# Patient Record
Sex: FEMALE | Race: WHITE | HISPANIC OR LATINO | Employment: FULL TIME | URBAN - METROPOLITAN AREA
[De-identification: names, ages, dates, MRNs, and addresses within clinical notes are randomized per-mention and may not be internally consistent; named-entity substitution may affect disease eponyms.]

---

## 2024-09-09 NOTE — PROGRESS NOTES
"Chief Complaint   Patient presents with    Annual Exam       HPI:   24 y.o. . Presents for well woman exam. Contraception:  Natural family planning, taking PNV daily   Menses:   q month, lasts 4-5 days, changes super tampon q 2-3 hrs on heaviest days.   Pain:  Moderate, not too bad  Last pap: records not available, normal per patient  Complaints: Pt has no complaints today.    Past Medical History:   Diagnosis Date    Anxiety     Migraine 3-5 years ago    Ovarian cyst 1-2 years ago      Past Surgical History:   Procedure Laterality Date    WISDOM TOOTH EXTRACTION        Family History   Problem Relation Age of Onset    Coronary artery disease Father     Hypertension Father     Diabetes Brother     Breast cancer Neg Hx     Uterine cancer Neg Hx     Ovarian cancer Neg Hx     Colon cancer Neg Hx      Allergies as of 10/04/2024    (No Known Allergies)        PCP: does manage PMHx and preventative labs    /74   Pulse 80   Ht 162.6 cm (64\")   Wt 93.4 kg (206 lb)   LMP 2024 (Exact Date)   BMI 35.36 kg/m²     PHYSICAL EXAM: Chaperone present   General- NAD, alert and oriented, appropriate  Psych- Normal mood, good memory  Neck- No masses, no thyroid enlargement  Lymphatic- No palpable neck, axillary, or groin nodes  CV- Regular rhythm, no murmurs  Resp- CTA to bases, no wheezes  Abdomen- Soft, non distended, non tender, no masses  Breast left-  Bilaterally symmetrical, no masses, non tender, no nipple discharge  Breast right- Bilaterally symmetrical, no masses, non tender, no nipple discharge  External genitalia- Normal female, no lesions  Urethra/meatus- Normal, no masses, non tender, no prolapse  Bladder- Normal, no masses, non tender, no prolapse  Vagina- Normal, no atrophy, no lesions, no discharge, no prolapse  Cvx- Normal, no lesions, no discharge, No cervical motion tenderness  Uterus- Normal size, shape & consistency.  Non tender, mobile.  Adnexa- No mass, non " tender  Anus/Rectum/Perineum- Not performed  Ext- No edema, no cyanosis    Skin- No lesions, no rashes, no acanthosis nigricans       ASSESSMENT and PLAN:    Diagnoses and all orders for this visit:    1. Well woman exam (Primary)  -     IGP,rfx Aptima HPV All Pth      Preventative:   BREAST HEALTH- Monthly self breast exam importance and how to reviewed. MMG and/or MRI (prn) reviewed per society guidelines and her individual history. Screening Imaging: Not medically needed  CERVICAL CANCER Screening- Reviewed current ASCCP guidelines for screening w and wo cotest HPV, age specific.  Screen: Updated today  COLON CANCER Screening- Reviewed current medical society guidelines and options.  Screen:  Not medically needed  SEXUAL HEALTH: Declines STD screening,  Safe sex and condoms  BONE HEALTH- Reviewed current medical society guidelines and options for testing, calcium and vit D intake.  Weight bearing exercise.  DEXA: Not medically needed  VACCINATIONS Recommended: COVID and booster PRN, Flu annually  Importance discussed, risk being unvaccinated reviewed.  Questions answered  Genetic testing: Does not qualify.  Smoking status- NON SMOKER  Follow up PCP/Specialist PMHx and Labs  TRACK MENSES, RTO if <q21d, >7d long, heavy or painful.    PNV daily and healthy lifestyle if desires pregnancy. Fu with PCP to discuss safer alternative tx for insomnia during pregnancy    Follow Up:  Return in about 1 year (around 10/4/2025).        Kellen Blank, APRN  10/04/2024

## 2024-09-24 ENCOUNTER — HOSPITAL ENCOUNTER (EMERGENCY)
Facility: HOSPITAL | Age: 24
Discharge: HOME | End: 2024-09-24
Attending: EMERGENCY MEDICINE | Admitting: EMERGENCY MEDICINE
Payer: COMMERCIAL

## 2024-09-24 VITALS
HEIGHT: 64 IN | WEIGHT: 205 LBS | OXYGEN SATURATION: 94 % | RESPIRATION RATE: 18 BRPM | SYSTOLIC BLOOD PRESSURE: 114 MMHG | DIASTOLIC BLOOD PRESSURE: 76 MMHG | TEMPERATURE: 98.2 F | BODY MASS INDEX: 35 KG/M2 | HEART RATE: 68 BPM

## 2024-09-24 DIAGNOSIS — N39.0 ACUTE UTI: Primary | ICD-10-CM

## 2024-09-24 LAB
ALBUMIN SERPL-MCNC: 4.1 G/DL (ref 3.5–5.2)
ALBUMIN/GLOB SERPL: 1.2 G/DL
ALP SERPL-CCNC: 87 U/L (ref 39–117)
ALT SERPL W P-5'-P-CCNC: 14 U/L (ref 1–33)
ANION GAP SERPL CALCULATED.3IONS-SCNC: 9.7 MMOL/L (ref 5–15)
AST SERPL-CCNC: 13 U/L (ref 1–32)
BACTERIA UR QL AUTO: ABNORMAL /HPF
BASOPHILS # BLD AUTO: 0.02 10*3/MM3 (ref 0–0.2)
BASOPHILS NFR BLD AUTO: 0.4 % (ref 0–1.5)
BILIRUB SERPL-MCNC: 0.3 MG/DL (ref 0–1.2)
BILIRUB UR QL STRIP: NEGATIVE
BUN SERPL-MCNC: 12 MG/DL (ref 6–20)
BUN/CREAT SERPL: 19.7 (ref 7–25)
CALCIUM SPEC-SCNC: 9.8 MG/DL (ref 8.6–10.5)
CHLORIDE SERPL-SCNC: 104 MMOL/L (ref 98–107)
CLARITY UR: ABNORMAL
CO2 SERPL-SCNC: 25.3 MMOL/L (ref 22–29)
COD CRY URNS QL: ABNORMAL /HPF
COLOR UR: ABNORMAL
CREAT SERPL-MCNC: 0.61 MG/DL (ref 0.57–1)
DEPRECATED RDW RBC AUTO: 39.8 FL (ref 37–54)
EGFRCR SERPLBLD CKD-EPI 2021: 129 ML/MIN/1.73
EOSINOPHIL # BLD AUTO: 0.06 10*3/MM3 (ref 0–0.4)
EOSINOPHIL NFR BLD AUTO: 1.1 % (ref 0.3–6.2)
ERYTHROCYTE [DISTWIDTH] IN BLOOD BY AUTOMATED COUNT: 12.9 % (ref 12.3–15.4)
GLOBULIN UR ELPH-MCNC: 3.5 GM/DL
GLUCOSE SERPL-MCNC: 94 MG/DL (ref 65–99)
GLUCOSE UR STRIP-MCNC: NEGATIVE MG/DL
HCG INTACT+B SERPL-ACNC: <0.5 MIU/ML
HCT VFR BLD AUTO: 42.7 % (ref 34–46.6)
HETEROPH AB SER QL LA: NEGATIVE
HGB BLD-MCNC: 14 G/DL (ref 12–15.9)
HGB UR QL STRIP.AUTO: ABNORMAL
HOLD SPECIMEN: NORMAL
HOLD SPECIMEN: NORMAL
HYALINE CASTS UR QL AUTO: ABNORMAL /LPF
HYPOCHROMIA BLD QL: NORMAL
IMM GRANULOCYTES # BLD AUTO: 0.01 10*3/MM3 (ref 0–0.05)
IMM GRANULOCYTES NFR BLD AUTO: 0.2 % (ref 0–0.5)
KETONES UR QL STRIP: NEGATIVE
LEUKOCYTE ESTERASE UR QL STRIP.AUTO: ABNORMAL
LIPASE SERPL-CCNC: 21 U/L (ref 13–60)
LYMPHOCYTES # BLD AUTO: 1.78 10*3/MM3 (ref 0.7–3.1)
LYMPHOCYTES NFR BLD AUTO: 33.1 % (ref 19.6–45.3)
MCH RBC QN AUTO: 27.6 PG (ref 26.6–33)
MCHC RBC AUTO-ENTMCNC: 32.8 G/DL (ref 31.5–35.7)
MCV RBC AUTO: 84.1 FL (ref 79–97)
MONOCYTES # BLD AUTO: 0.47 10*3/MM3 (ref 0.1–0.9)
MONOCYTES NFR BLD AUTO: 8.7 % (ref 5–12)
MUCOUS THREADS URNS QL MICRO: ABNORMAL /HPF
NEUTROPHILS NFR BLD AUTO: 3.04 10*3/MM3 (ref 1.7–7)
NEUTROPHILS NFR BLD AUTO: 56.5 % (ref 42.7–76)
NITRITE UR QL STRIP: NEGATIVE
NRBC BLD AUTO-RTO: 0 /100 WBC (ref 0–0.2)
PH UR STRIP.AUTO: 6 [PH] (ref 5–8)
PLATELET # BLD AUTO: 266 10*3/MM3 (ref 140–450)
PMV BLD AUTO: 9.8 FL (ref 6–12)
POTASSIUM SERPL-SCNC: 4 MMOL/L (ref 3.5–5.2)
PROT SERPL-MCNC: 7.6 G/DL (ref 6–8.5)
PROT UR QL STRIP: ABNORMAL
RBC # BLD AUTO: 5.08 10*6/MM3 (ref 3.77–5.28)
RBC # UR STRIP: ABNORMAL /HPF
REF LAB TEST METHOD: ABNORMAL
SMALL PLATELETS BLD QL SMEAR: ADEQUATE
SODIUM SERPL-SCNC: 139 MMOL/L (ref 136–145)
SP GR UR STRIP: 1.03 (ref 1–1.03)
SQUAMOUS #/AREA URNS HPF: ABNORMAL /HPF
UROBILINOGEN UR QL STRIP: ABNORMAL
WBC # UR STRIP: ABNORMAL /HPF
WBC MORPH BLD: NORMAL
WBC NRBC COR # BLD AUTO: 5.38 10*3/MM3 (ref 3.4–10.8)
WHOLE BLOOD HOLD COAG: NORMAL
WHOLE BLOOD HOLD SPECIMEN: NORMAL

## 2024-09-24 PROCEDURE — 84702 CHORIONIC GONADOTROPIN TEST: CPT | Performed by: EMERGENCY MEDICINE

## 2024-09-24 PROCEDURE — 25810000003 SODIUM CHLORIDE 0.9 % SOLUTION: Performed by: EMERGENCY MEDICINE

## 2024-09-24 PROCEDURE — 86308 HETEROPHILE ANTIBODY SCREEN: CPT | Performed by: EMERGENCY MEDICINE

## 2024-09-24 PROCEDURE — 85007 BL SMEAR W/DIFF WBC COUNT: CPT | Performed by: EMERGENCY MEDICINE

## 2024-09-24 PROCEDURE — 80053 COMPREHEN METABOLIC PANEL: CPT | Performed by: EMERGENCY MEDICINE

## 2024-09-24 PROCEDURE — 25010000002 CEFTRIAXONE PER 250 MG: Performed by: EMERGENCY MEDICINE

## 2024-09-24 PROCEDURE — 25010000002 KETOROLAC TROMETHAMINE PER 15 MG: Performed by: EMERGENCY MEDICINE

## 2024-09-24 PROCEDURE — 96365 THER/PROPH/DIAG IV INF INIT: CPT

## 2024-09-24 PROCEDURE — 81001 URINALYSIS AUTO W/SCOPE: CPT | Performed by: EMERGENCY MEDICINE

## 2024-09-24 PROCEDURE — 99283 EMERGENCY DEPT VISIT LOW MDM: CPT

## 2024-09-24 PROCEDURE — 83690 ASSAY OF LIPASE: CPT | Performed by: EMERGENCY MEDICINE

## 2024-09-24 PROCEDURE — 85025 COMPLETE CBC W/AUTO DIFF WBC: CPT | Performed by: EMERGENCY MEDICINE

## 2024-09-24 PROCEDURE — 25010000002 ONDANSETRON PER 1 MG: Performed by: EMERGENCY MEDICINE

## 2024-09-24 PROCEDURE — 96375 TX/PRO/DX INJ NEW DRUG ADDON: CPT

## 2024-09-24 RX ORDER — ONDANSETRON 4 MG/1
4 TABLET, ORALLY DISINTEGRATING ORAL EVERY 8 HOURS PRN
COMMUNITY
Start: 2024-09-19

## 2024-09-24 RX ORDER — CEPHALEXIN 500 MG/1
500 CAPSULE ORAL 3 TIMES DAILY
Qty: 21 CAPSULE | Refills: 0 | Status: SHIPPED | OUTPATIENT
Start: 2024-09-24

## 2024-09-24 RX ORDER — ESCITALOPRAM OXALATE 10 MG/1
10 TABLET ORAL EVERY MORNING
COMMUNITY
Start: 2024-07-23

## 2024-09-24 RX ORDER — ONDANSETRON 2 MG/ML
4 INJECTION INTRAMUSCULAR; INTRAVENOUS ONCE
Status: COMPLETED | OUTPATIENT
Start: 2024-09-24 | End: 2024-09-24

## 2024-09-24 RX ORDER — QUETIAPINE FUMARATE 25 MG/1
25 TABLET, FILM COATED ORAL
COMMUNITY
Start: 2024-07-23

## 2024-09-24 RX ORDER — SODIUM CHLORIDE 0.9 % (FLUSH) 0.9 %
10 SYRINGE (ML) INJECTION AS NEEDED
Status: DISCONTINUED | OUTPATIENT
Start: 2024-09-24 | End: 2024-09-24 | Stop reason: HOSPADM

## 2024-09-24 RX ORDER — KETOROLAC TROMETHAMINE 30 MG/ML
30 INJECTION, SOLUTION INTRAMUSCULAR; INTRAVENOUS ONCE
Status: COMPLETED | OUTPATIENT
Start: 2024-09-24 | End: 2024-09-24

## 2024-09-24 RX ORDER — VALACYCLOVIR HYDROCHLORIDE 1 G/1
1000 TABLET, FILM COATED ORAL 3 TIMES DAILY
Qty: 30 TABLET | Refills: 0 | Status: SHIPPED | OUTPATIENT
Start: 2024-09-24

## 2024-09-24 RX ORDER — FAMOTIDINE 10 MG/ML
20 INJECTION, SOLUTION INTRAVENOUS ONCE
Status: COMPLETED | OUTPATIENT
Start: 2024-09-24 | End: 2024-09-24

## 2024-09-24 RX ADMIN — CEFTRIAXONE SODIUM 2000 MG: 2 INJECTION, POWDER, FOR SOLUTION INTRAMUSCULAR; INTRAVENOUS at 11:14

## 2024-09-24 RX ADMIN — ONDANSETRON 4 MG: 2 INJECTION INTRAMUSCULAR; INTRAVENOUS at 10:57

## 2024-09-24 RX ADMIN — KETOROLAC TROMETHAMINE 30 MG: 30 INJECTION, SOLUTION INTRAMUSCULAR; INTRAVENOUS at 10:58

## 2024-09-24 RX ADMIN — SODIUM CHLORIDE 1000 ML: 9 INJECTION, SOLUTION INTRAVENOUS at 10:55

## 2024-09-24 RX ADMIN — FAMOTIDINE 20 MG: 10 INJECTION INTRAVENOUS at 10:57

## 2024-09-24 NOTE — ED PROVIDER NOTES
Time: 10:26 AM EDT  Date of encounter:  9/24/2024  Independent Historian/Clinical History and Information was obtained by:   Patient    History is limited by: N/A    Chief Complaint: Nausea and sore throat      History of Present Illness:  Patient is a 24 y.o. year old female who presents to the emergency department for evaluation of nausea and sore throat accompanied by fever that started last week.  Patient also reports burning when she eats.  Patient has no cough or hemoptysis.  Patient denies dysuria and urinary frequency.  Patient does report some mild abdominal pain.  Patient has had no diarrhea.      Patient Care Team  Primary Care Provider: Marina Booth APRN    Past Medical History:     No Known Allergies  History reviewed. No pertinent past medical history.  History reviewed. No pertinent surgical history.  History reviewed. No pertinent family history.    Home Medications:  Prior to Admission medications    Medication Sig Start Date End Date Taking? Authorizing Provider   escitalopram (LEXAPRO) 10 MG tablet Take 1 tablet by mouth Every Morning. 7/23/24  Yes Hui Arshad MD   ondansetron ODT (ZOFRAN-ODT) 4 MG disintegrating tablet Place 1 tablet on the tongue Every 8 (Eight) Hours As Needed. 9/19/24  Yes Hui Arshad MD   QUEtiapine (SEROquel) 25 MG tablet Take 1 tablet by mouth every night at bedtime. 7/23/24  Yes Hui Arshad MD        Social History:   Social History     Tobacco Use    Smoking status: Never    Smokeless tobacco: Never   Vaping Use    Vaping status: Never Used   Substance Use Topics    Alcohol use: Not Currently         Review of Systems:  Review of Systems   Constitutional:  Positive for fever. Negative for chills.   HENT:  Negative for congestion, rhinorrhea and sore throat.    Eyes:  Negative for pain and visual disturbance.   Respiratory:  Negative for apnea, cough, chest tightness and shortness of breath.    Cardiovascular:  Negative for chest pain and  "palpitations.   Gastrointestinal:  Positive for nausea. Negative for abdominal pain, diarrhea and vomiting.   Genitourinary:  Negative for difficulty urinating and dysuria.   Musculoskeletal:  Negative for joint swelling and myalgias.   Skin:  Negative for color change.   Neurological:  Negative for seizures and headaches.   Psychiatric/Behavioral: Negative.     All other systems reviewed and are negative.       Physical Exam:  /76 (BP Location: Left arm, Patient Position: Lying)   Pulse 68   Temp 98.2 °F (36.8 °C) (Oral)   Resp 18   Ht 162.6 cm (64\")   Wt 93 kg (205 lb)   LMP 09/02/2024 (Approximate)   SpO2 94%   BMI 35.19 kg/m²     Physical Exam  Vitals and nursing note reviewed.   Constitutional:       General: She is not in acute distress.     Appearance: Normal appearance. She is not toxic-appearing.   HENT:      Head: Normocephalic and atraumatic.      Jaw: There is normal jaw occlusion.   Eyes:      General: Lids are normal.      Extraocular Movements: Extraocular movements intact.      Conjunctiva/sclera: Conjunctivae normal.      Pupils: Pupils are equal, round, and reactive to light.   Cardiovascular:      Rate and Rhythm: Normal rate and regular rhythm.      Pulses: Normal pulses.      Heart sounds: Normal heart sounds.   Pulmonary:      Effort: Pulmonary effort is normal. No respiratory distress.      Breath sounds: Normal breath sounds. No wheezing or rhonchi.   Abdominal:      General: Abdomen is flat.      Palpations: Abdomen is soft.      Tenderness: There is no abdominal tenderness. There is no guarding or rebound.   Musculoskeletal:         General: Normal range of motion.      Cervical back: Normal range of motion and neck supple.      Right lower leg: No edema.      Left lower leg: No edema.   Skin:     General: Skin is warm and dry.   Neurological:      Mental Status: She is alert and oriented to person, place, and time. Mental status is at baseline.   Psychiatric:         Mood and " Affect: Mood normal.                  Procedures:  Procedures      Medical Decision Making:      Comorbidities that affect care:    None    External Notes reviewed:    None      The following orders were placed and all results were independently analyzed by me:  Orders Placed This Encounter   Procedures    [place] Draw    Comprehensive Metabolic Panel    Lipase    Urinalysis With Microscopic If Indicated (No Culture) - Urine, Clean Catch    hCG, Quantitative, Pregnancy    CBC Auto Differential    Scan Slide    Mononucleosis Screen    Urinalysis, Microscopic Only - Urine, Clean Catch    NPO Diet NPO Type: Strict NPO    Undress & Gown    Insert Peripheral IV    CBC & Differential    Green Top (Gel)    Lavender Top    Gold Top - SST    Light Blue Top       Medications Given in the Emergency Department:  Medications   sodium chloride 0.9 % flush 10 mL (has no administration in time range)   sodium chloride 0.9 % bolus 1,000 mL (0 mL Intravenous Stopped 9/24/24 1125)   ketorolac (TORADOL) injection 30 mg (30 mg Intravenous Given 9/24/24 1058)   ondansetron (ZOFRAN) injection 4 mg (4 mg Intravenous Given 9/24/24 1057)   famotidine (PEPCID) injection 20 mg (20 mg Intravenous Given 9/24/24 1057)   cefTRIAXone (ROCEPHIN) 2,000 mg in sodium chloride 0.9 % 100 mL IVPB-VTB (0 mg Intravenous Stopped 9/24/24 1144)        ED Course:         Labs:    Lab Results (last 24 hours)       Procedure Component Value Units Date/Time    CBC & Differential [[id number]] Collected: 09/24/24 1012    Specimen: Blood from Arm, Right Updated: 09/24/24 1048    Narrative:      The following orders were created for panel order CBC & Differential.  Procedure                               Abnormality         Status                     ---------                               -----------         ------                     CBC Auto Differential[[id number]]        Normal              Final result               Scan Slide[[id number]]                                        Final result                 Please view results for these tests on the individual orders.    Comprehensive Metabolic Panel [[id number]] Collected: 09/24/24 1012    Specimen: Blood from Arm, Right Updated: 09/24/24 1045     Glucose 94 mg/dL      BUN 12 mg/dL      Creatinine 0.61 mg/dL      Sodium 139 mmol/L      Potassium 4.0 mmol/L      Chloride 104 mmol/L      CO2 25.3 mmol/L      Calcium 9.8 mg/dL      Total Protein 7.6 g/dL      Albumin 4.1 g/dL      ALT (SGPT) 14 U/L      AST (SGOT) 13 U/L      Alkaline Phosphatase 87 U/L      Total Bilirubin 0.3 mg/dL      Globulin 3.5 gm/dL      A/G Ratio 1.2 g/dL      BUN/Creatinine Ratio 19.7     Anion Gap 9.7 mmol/L      eGFR 129.0 mL/min/1.73     Narrative:      GFR Normal >60  Chronic Kidney Disease <60  Kidney Failure <15      Lipase [[id number]]  (Normal) Collected: 09/24/24 1012    Specimen: Blood from Arm, Right Updated: 09/24/24 1045     Lipase 21 U/L     hCG, Quantitative, Pregnancy [[id number]] Collected: 09/24/24 1012    Specimen: Blood from Arm, Right Updated: 09/24/24 1041     HCG Quantitative <0.50 mIU/mL     Narrative:      HCG Ranges by Gestational Age    Females - non-pregnant premenopausal   </= 1mIU/mL HCG  Females - postmenopausal               </= 7mIU/mL HCG    3 Weeks       5.4   -      72 mIU/mL  4 Weeks      10.2   -     708 mIU/mL  5 Weeks       217   -   8,245 mIU/mL  6 Weeks       152   -  32,177 mIU/mL  7 Weeks     4,059   - 153,767 mIU/mL  8 Weeks    31,366   - 149,094 mIU/mL  9 Weeks    59,109   - 135,901 mIU/mL  10 Weeks   44,186   - 170,409 mIU/mL  12 Weeks   27,107   - 201,615 mIU/mL  14 Weeks   24,302   -  93,646 mIU/mL  15 Weeks   12,540   -  69,747 mIU/mL  16 Weeks    8,904   -  55,332 mIU/mL  17 Weeks    8,240   -  51,793 mIU/mL  18 Weeks    9,649   -  55,271 mIU/mL      CBC Auto Differential [[id number]]  (Normal) Collected: 09/24/24 1012    Specimen: Blood from Arm, Right Updated: 09/24/24 1048     WBC 5.38 10*3/mm3      RBC  5.08 10*6/mm3      Hemoglobin 14.0 g/dL      Hematocrit 42.7 %      MCV 84.1 fL      MCH 27.6 pg      MCHC 32.8 g/dL      RDW 12.9 %      RDW-SD 39.8 fl      MPV 9.8 fL      Platelets 266 10*3/mm3      Neutrophil % 56.5 %      Lymphocyte % 33.1 %      Monocyte % 8.7 %      Eosinophil % 1.1 %      Basophil % 0.4 %      Immature Grans % 0.2 %      Neutrophils, Absolute 3.04 10*3/mm3      Lymphocytes, Absolute 1.78 10*3/mm3      Monocytes, Absolute 0.47 10*3/mm3      Eosinophils, Absolute 0.06 10*3/mm3      Basophils, Absolute 0.02 10*3/mm3      Immature Grans, Absolute 0.01 10*3/mm3      nRBC 0.0 /100 WBC     Scan Slide [[id number]] Collected: 09/24/24 1012    Specimen: Blood from Arm, Right Updated: 09/24/24 1048     Hypochromia Slight/1+     WBC Morphology Normal     Platelet Estimate Adequate    Mononucleosis Screen [[id number]]  (Normal) Collected: 09/24/24 1012    Specimen: Blood from Arm, Right Updated: 09/24/24 1058     Monospot Negative    Urinalysis With Microscopic If Indicated (No Culture) - Urine, Clean Catch [[id number]]  (Abnormal) Collected: 09/24/24 1033    Specimen: Urine, Clean Catch Updated: 09/24/24 1049     Color, UA Dark Yellow     Appearance, UA Cloudy     pH, UA 6.0     Specific Gravity, UA 1.027     Glucose, UA Negative     Ketones, UA Negative     Bilirubin, UA Negative     Blood, UA Trace     Protein, UA Trace     Leuk Esterase, UA Small (1+)     Nitrite, UA Negative     Urobilinogen, UA 1.0 E.U./dL    Urinalysis, Microscopic Only - Urine, Clean Catch [[id number]]  (Abnormal) Collected: 09/24/24 1033    Specimen: Urine, Clean Catch Updated: 09/24/24 1101     RBC, UA 0-2 /HPF      WBC, UA 3-5 /HPF      Bacteria, UA 1+ /HPF      Squamous Epithelial Cells, UA 13-20 /HPF      Hyaline Casts, UA None Seen /LPF      Calcium Oxalate Crystals, UA Small/1+ /HPF      Mucus, UA Small/1+ /HPF      Methodology Manual Light Microscopy             Imaging:    No Radiology Exams Resulted Within Past 24  Hours      Differential Diagnosis and Discussion:    Fever: Based on the complaint of fever, differential diagnosis includes but is not limited to meningitis, pneumonia, pyelonephritis, acute uti,  systemic immune response syndrome, sepsis, viral syndrome, fungal infection, tick born illness and other bacterial infections.    All labs were reviewed and interpreted by me.    MDM     Based on the results of the patient's urinalysis and urinary complaints, signs, symptoms, and diagnostic testing is consistent with a urinary tract infection.  The patient´s CBC that was reviewed and interpreted by me shows no abnormalities of critical concern. Of note, there is no anemia requiring a blood transfusion and the platelet count is acceptable.  The patient´s CMP that was reviewed and interpretted by me shows no abnormalities of critical concern. Of note, the patient´s sodium and potassium are acceptable. The patient´s liver enzymes are unremarkable. The patient´s renal function (creatinine) is preserved. The patient has a normal anion gap.  Monospot is negative.  The patient had some lesions in her ear concerning for viral outbreak.  Patient is resting comfortably, is alert, and is in no distress. The repeat examination is unremarkable and benign. The patient has no signs of urosepsis. The patient was started on antibiotics in the emergency department and will be discharged with antibiotics as an outpatient. The patient was counseled to return to the ER for fever >100.5, intractable pain or vomiting, or any other concerns that the may have. The patient has expressed a clear and thorough understanding and agreed to follow up as instructed.                Patient Care Considerations:    I considered a soft tissue CT neck, however the patient has no signs of peritonsillar abscess.      Consultants/Shared Management Plan:    None    Social Determinants of Health:    Patient is independent, reliable, and has access to care.        Disposition and Care Coordination:    Discharged: I considered escalation of care by admitting this patient to the hospital, however patient has no fever that is uncontrolled and is able to tolerate p.o. intake.    I have explained the patient´s condition, diagnoses and treatment plan based on the information available to me at this time. I have answered questions and addressed any concerns. The patient has a good  understanding of the patient´s diagnosis, condition, and treatment plan as can be expected at this point. The vital signs have been stable. The patient´s condition is stable and appropriate for discharge from the emergency department.      The patient will pursue further outpatient evaluation with the primary care physician or other designated or consulting physician as outlined in the discharge instructions. They are agreeable to this plan of care and follow-up instructions have been explained in detail. The patient has received these instructions in written format and has expressed an understanding of the discharge instructions. The patient is aware that any significant change in condition or worsening of symptoms should prompt an immediate return to this or the closest emergency department or call to 911.  I have explained discharge medications and the need for follow up with the patient/caretakers. This was also printed in the discharge instructions. Patient was discharged with the following medications and follow up:      Medication List        New Prescriptions      cephalexin 500 MG capsule  Commonly known as: KEFLEX  Take 1 capsule by mouth 3 (Three) Times a Day.     valACYclovir 1000 MG tablet  Commonly known as: VALTREX  Take 1 tablet by mouth 3 (Three) Times a Day.               Where to Get Your Medications        These medications were sent to [facility]/pharmacy #[id number] - [place], [place] - [address] - [phone] [facility] [phone] FX  [address] [place] [address]       Hours: 24-hours Phone: [phone]   cephalexin 500 MG capsule  valACYclovir 1000 MG tablet      Marina Booth, APRN  [address] [address]  [phone]    In 2 days         Final diagnoses:   Acute UTI        ED Disposition       ED Disposition   Discharge    Condition   Stable    Comment   --               This medical record created using voice recognition software.             Charley Orlando MD  09/24/24 0163

## 2024-10-04 ENCOUNTER — OFFICE VISIT (OUTPATIENT)
Dept: OBSTETRICS AND GYNECOLOGY | Facility: CLINIC | Age: 24
End: 2024-10-04
Payer: COMMERCIAL

## 2024-10-04 VITALS
WEIGHT: 206 LBS | HEART RATE: 80 BPM | DIASTOLIC BLOOD PRESSURE: 74 MMHG | SYSTOLIC BLOOD PRESSURE: 113 MMHG | HEIGHT: 64 IN | BODY MASS INDEX: 35.17 KG/M2

## 2024-10-04 DIAGNOSIS — Z01.419 WELL WOMAN EXAM: Primary | ICD-10-CM

## 2024-10-04 PROCEDURE — G0123 SCREEN CERV/VAG THIN LAYER: HCPCS | Performed by: NURSE PRACTITIONER

## 2024-10-11 LAB
CONV .: NORMAL
CYTOLOGIST CVX/VAG CYTO: NORMAL
CYTOLOGY CVX/VAG DOC CYTO: NORMAL
CYTOLOGY CVX/VAG DOC THIN PREP: NORMAL
DX ICD CODE: NORMAL
Lab: NORMAL
Lab: NORMAL
OTHER STN SPEC: NORMAL
STAT OF ADQ CVX/VAG CYTO-IMP: NORMAL

## 2024-11-20 ENCOUNTER — INITIAL PRENATAL (OUTPATIENT)
Dept: OBSTETRICS AND GYNECOLOGY | Age: 24
End: 2024-11-20
Payer: COMMERCIAL

## 2024-11-20 ENCOUNTER — LAB (OUTPATIENT)
Facility: HOSPITAL | Age: 24
End: 2024-11-20
Payer: COMMERCIAL

## 2024-11-20 VITALS — BODY MASS INDEX: 36.56 KG/M2 | SYSTOLIC BLOOD PRESSURE: 126 MMHG | WEIGHT: 213 LBS | DIASTOLIC BLOOD PRESSURE: 82 MMHG

## 2024-11-20 DIAGNOSIS — O21.9 NAUSEA AND VOMITING IN PREGNANCY: ICD-10-CM

## 2024-11-20 DIAGNOSIS — Z3A.01 7 WEEKS GESTATION OF PREGNANCY: ICD-10-CM

## 2024-11-20 DIAGNOSIS — Z3A.01 LESS THAN 8 WEEKS GESTATION OF PREGNANCY: Primary | ICD-10-CM

## 2024-11-20 LAB
ABO GROUP BLD: NORMAL
BASOPHILS # BLD AUTO: 0.04 10*3/MM3 (ref 0–0.2)
BASOPHILS NFR BLD AUTO: 0.6 % (ref 0–1.5)
BLD GP AB SCN SERPL QL: NEGATIVE
DEPRECATED RDW RBC AUTO: 42.9 FL (ref 37–54)
EOSINOPHIL # BLD AUTO: 0.1 10*3/MM3 (ref 0–0.4)
EOSINOPHIL NFR BLD AUTO: 1.4 % (ref 0.3–6.2)
ERYTHROCYTE [DISTWIDTH] IN BLOOD BY AUTOMATED COUNT: 13.4 % (ref 12.3–15.4)
HBA1C MFR BLD: 5 % (ref 4.8–5.6)
HBV SURFACE AG SERPL QL IA: NORMAL
HCT VFR BLD AUTO: 39.7 % (ref 34–46.6)
HCV AB SER QL: NORMAL
HGB BLD-MCNC: 12.6 G/DL (ref 12–15.9)
HIV 1+2 AB+HIV1 P24 AG SERPL QL IA: NORMAL
IMM GRANULOCYTES # BLD AUTO: 0.03 10*3/MM3 (ref 0–0.05)
IMM GRANULOCYTES NFR BLD AUTO: 0.4 % (ref 0–0.5)
LYMPHOCYTES # BLD AUTO: 1.08 10*3/MM3 (ref 0.7–3.1)
LYMPHOCYTES NFR BLD AUTO: 15.3 % (ref 19.6–45.3)
MCH RBC QN AUTO: 28.1 PG (ref 26.6–33)
MCHC RBC AUTO-ENTMCNC: 31.7 G/DL (ref 31.5–35.7)
MCV RBC AUTO: 88.6 FL (ref 79–97)
MONOCYTES # BLD AUTO: 0.65 10*3/MM3 (ref 0.1–0.9)
MONOCYTES NFR BLD AUTO: 9.2 % (ref 5–12)
NEUTROPHILS NFR BLD AUTO: 5.17 10*3/MM3 (ref 1.7–7)
NEUTROPHILS NFR BLD AUTO: 73.1 % (ref 42.7–76)
NRBC BLD AUTO-RTO: 0 /100 WBC (ref 0–0.2)
PLATELET # BLD AUTO: 257 10*3/MM3 (ref 140–450)
PMV BLD AUTO: 11.6 FL (ref 6–12)
RBC # BLD AUTO: 4.48 10*6/MM3 (ref 3.77–5.28)
RH BLD: POSITIVE
RPR SER QL: NORMAL
WBC NRBC COR # BLD AUTO: 7.07 10*3/MM3 (ref 3.4–10.8)

## 2024-11-20 PROCEDURE — 80081 OBSTETRIC PANEL INC HIV TSTG: CPT | Performed by: STUDENT IN AN ORGANIZED HEALTH CARE EDUCATION/TRAINING PROGRAM

## 2024-11-20 PROCEDURE — 83036 HEMOGLOBIN GLYCOSYLATED A1C: CPT | Performed by: STUDENT IN AN ORGANIZED HEALTH CARE EDUCATION/TRAINING PROGRAM

## 2024-11-20 PROCEDURE — 86803 HEPATITIS C AB TEST: CPT | Performed by: STUDENT IN AN ORGANIZED HEALTH CARE EDUCATION/TRAINING PROGRAM

## 2024-11-20 PROCEDURE — 87591 N.GONORRHOEAE DNA AMP PROB: CPT | Performed by: STUDENT IN AN ORGANIZED HEALTH CARE EDUCATION/TRAINING PROGRAM

## 2024-11-20 PROCEDURE — 86787 VARICELLA-ZOSTER ANTIBODY: CPT | Performed by: STUDENT IN AN ORGANIZED HEALTH CARE EDUCATION/TRAINING PROGRAM

## 2024-11-20 PROCEDURE — 87086 URINE CULTURE/COLONY COUNT: CPT | Performed by: STUDENT IN AN ORGANIZED HEALTH CARE EDUCATION/TRAINING PROGRAM

## 2024-11-20 PROCEDURE — 36415 COLL VENOUS BLD VENIPUNCTURE: CPT | Performed by: STUDENT IN AN ORGANIZED HEALTH CARE EDUCATION/TRAINING PROGRAM

## 2024-11-20 PROCEDURE — 87491 CHLMYD TRACH DNA AMP PROBE: CPT | Performed by: STUDENT IN AN ORGANIZED HEALTH CARE EDUCATION/TRAINING PROGRAM

## 2024-11-20 RX ORDER — DIPHENHYDRAMINE HYDROCHLORIDE 25 MG/1
25 CAPSULE ORAL 3 TIMES DAILY
Qty: 90 TABLET | Refills: 3 | Status: SHIPPED | OUTPATIENT
Start: 2024-11-20

## 2024-11-20 NOTE — PROGRESS NOTES
[facility]   Obstetrics and Gynecology   New Obstetric Visit    2024    Patient: Rossy Silva          MR#:[id number]    History of Present Illness    Chief Complaint   Patient presents with    Initial Prenatal Visit     CC: new ob lmp 24.        24 y.o. female  at 7w4d presents for NOB visit.  She is doing well today.  Taking prenatal vitamins. She is having some nausea and appetite loss, but no vomiting. She also is having trouble sleeping as she was on Seroquel prior to pregnancy and has discontinued this.     Studies reviewed:  US Ob Transvaginal (2024 08:45)     ________________________________________  Patient Active Problem List   Diagnosis    7 weeks gestation of pregnancy     OB History          2    Para        Term                AB   1    Living             SAB   1    IAB        Ectopic        Molar        Multiple        Live Births                      Social History:  Denies h/o gonorrhea, chlamydia, herpes, syphilis, HIV  Denies family history of birth defects and genetic disorders    Past Medical History:   Diagnosis Date    Anxiety     Migraine 3-5 years ago    Ovarian cyst 1-2 years ago     Past Surgical History:   Procedure Laterality Date    WISDOM TOOTH EXTRACTION       Social History     Tobacco Use   Smoking Status Never   Smokeless Tobacco Never   Tobacco Comments    n/a     Family History   Problem Relation Age of Onset    Coronary artery disease Father     Hypertension Father     Diabetes Brother     Breast cancer Neg Hx     Uterine cancer Neg Hx     Ovarian cancer Neg Hx     Colon cancer Neg Hx      Prior to Admission medications    Medication Sig Start Date End Date Taking? Authorizing Provider   valACYclovir (VALTREX) 1000 MG tablet Take 1 tablet by mouth 3 (Three) Times a Day. 24   Charley Orlando MD   escitalopram (LEXAPRO) 10 MG tablet Take 1 tablet by mouth Every Morning. 24  ProviderHui MD  "  QUEtiapine (SEROquel) 25 MG tablet Take 1 tablet by mouth every night at bedtime. 24  Provider, MD Hui     ________________________________________    The following portions of the patient's history were reviewed and updated as appropriate: allergies, current medications, past family history, past medical history, past social history, past surgical history, and problem list.    Review of Systems   All other systems reviewed and are negative.           Objective     /82   Wt 96.6 kg (213 lb)   LMP 2024 (Exact Date)   BMI 36.56 kg/m²    BP Readings from Last 3 Encounters:   24 126/82   10/04/24 113/74   24 114/76      Wt Readings from Last 3 Encounters:   24 96.6 kg (213 lb)   10/04/24 93.4 kg (206 lb)   24 93 kg (205 lb)        BMI: Estimated body mass index is 36.56 kg/m² as calculated from the following:    Height as of 10/4/24: 162.6 cm (64\").    Weight as of this encounter: 96.6 kg (213 lb).    Physical Exam  Vitals and nursing note reviewed.   Constitutional:       Appearance: Normal appearance.   HENT:      Head: Normocephalic and atraumatic.   Pulmonary:      Effort: Pulmonary effort is normal.   Neurological:      Mental Status: She is alert and oriented to person, place, and time.   Psychiatric:         Mood and Affect: Mood normal.           Assessment:  24 y.o. female  at 7w4d presents for NOB visit.  Diagnoses and all orders for this visit:    1. Less than 8 weeks gestation of pregnancy (Primary)  -     OB Panel With HIV and RPR  -     Varicella Zoster Antibody, IgG  -     Hemoglobin A1c  -     Hepatitis C Antibody  -     Urine Culture - Urine, Urine, Clean Catch  -     Chlamydia trachomatis, Neisseria gonorrhoeae, PCR - Urine, Urine, Clean Catch    2. Nausea and vomiting in pregnancy  -     doxylamine (UNISOM) 25 MG tablet; Take 1 tablet by mouth At Night As Needed for Nausea.  Dispense: 60 tablet; Refill: 2  -     vitamin B-6 " (PYRIDOXINE) 25 MG tablet; Take 1 tablet by mouth 3 times a day.  Dispense: 90 tablet; Refill: 3    3. 7 weeks gestation of pregnancy  Overview:  -PNL: IOB labs collected 11/20/24, plan for GBS at 36wga  -Pap: completed 10/4/24 > normal   -Genetics: cfDNA/msAFP/carrier after 10 weeks, anatomy Us @20 weeks  -Imm: titers pending, tdap after 20 weeks  -Contraception: _  -Birthplan: _  -Care coordination: accepts blood transfusions, no latex allergy, call schedule reviewed                 Plan:  Return in about 4 weeks (around 12/18/2024).      Spuriya Sarmiento MD  11/20/2024 09:24 EST

## 2024-11-21 LAB
BACTERIA SPEC AEROBE CULT: NORMAL
C TRACH RRNA SPEC QL NAA+PROBE: NEGATIVE
N GONORRHOEA RRNA SPEC QL NAA+PROBE: NEGATIVE
RUBV IGG SERPL IA-ACNC: 1.39 INDEX
VZV IGG SER QL IA: REACTIVE

## 2024-12-18 ENCOUNTER — ROUTINE PRENATAL (OUTPATIENT)
Dept: OBSTETRICS AND GYNECOLOGY | Age: 24
End: 2024-12-18
Payer: COMMERCIAL

## 2024-12-18 ENCOUNTER — LAB (OUTPATIENT)
Facility: HOSPITAL | Age: 24
End: 2024-12-18
Payer: COMMERCIAL

## 2024-12-18 VITALS — DIASTOLIC BLOOD PRESSURE: 78 MMHG | WEIGHT: 209.8 LBS | BODY MASS INDEX: 36.01 KG/M2 | SYSTOLIC BLOOD PRESSURE: 112 MMHG

## 2024-12-18 DIAGNOSIS — Z34.81 ENCOUNTER FOR SUPERVISION OF OTHER NORMAL PREGNANCY IN FIRST TRIMESTER: Primary | ICD-10-CM

## 2024-12-18 DIAGNOSIS — Z34.81 ENCOUNTER FOR SUPERVISION OF OTHER NORMAL PREGNANCY, FIRST TRIMESTER: ICD-10-CM

## 2024-12-18 LAB
CLARITY, POC: CLEAR
COLOR UR: YELLOW
GLUCOSE UR STRIP-MCNC: NEGATIVE MG/DL
PROT UR STRIP-MCNC: NEGATIVE MG/DL

## 2024-12-18 NOTE — PROGRESS NOTES
Rossy Silva, a 24 y.o.  at 11w4d, presents for OB follow-up.  She is doing well today.  Denies loss of fluid, vaginal bleeding, and contractions.  Endorses fetal movements.    Objective  BP Readings from Last 3 Encounters:   24 112/78   24 126/82   10/04/24 113/74      Wt Readings from Last 3 Encounters:   24 95.2 kg (209 lb 12.8 oz)   24 96.6 kg (213 lb)   10/04/24 93.4 kg (206 lb)      Total weight gain this pregnancy: 1.724 kg (3 lb 12.8 oz)    General: Awake, alert, no apparent distress  Respiratory: No increased work of breathing  Abdomen: Fundus soft and nontender  Extremity: Nontender, no edema    A/P  Diagnoses and all orders for this visit:    1. Encounter for supervision of other normal pregnancy in first trimester (Primary)  -     POC Urinalysis Dipstick  -     Noemi Panorama Prenatal Test: Chromosomes 13, 18, 21, X & Y: Triploidy 22Q.11.2 Deletion - Blood,  -     Noemi Horizon 14 (Pan-Ethnic Standard) - Blood,    2. Encounter for supervision of other normal pregnancy, first trimester  Overview:  -PNL: IOB labs reviewed, O+ plan for GBS at 36wga  -Pap: completed 10/4/24 > normal   -Genetics: cfDNA/msAFP/carrier after 10 weeks, anatomy Us @20 weeks  -Imm: RI/VI, tdap after 20 weeks  -Contraception: _  -Birthplan: _  -Care coordination: accepts blood transfusions, no latex allergy, call schedule reviewed               Labor precautions reviewed  Return in about 4 weeks (around 1/15/2025).    Supriya Sarmiento MD

## 2024-12-25 LAB
Lab: NORMAL
NTRA FETAL FRACTION: NORMAL
NTRA GENDER OF FETUS: NORMAL
NTRA MONOSOMY X AGE-BASED RISK TEXT: NORMAL
NTRA MONOSOMY X RESULT TEXT: NORMAL
NTRA MONOSOMY X RISK SCORE TEXT: NORMAL
NTRA TRIPLOIDY RESULT TEXT: NORMAL
NTRA TRISOMY 13 AGE-BASED RISK TEXT: NORMAL
NTRA TRISOMY 13 RESULT TEXT: NORMAL
NTRA TRISOMY 13 RISK SCORE TEXT: NORMAL
NTRA TRISOMY 18 AGE-BASED RISK TEXT: NORMAL
NTRA TRISOMY 18 RESULT TEXT: NORMAL
NTRA TRISOMY 18 RISK SCORE TEXT: NORMAL
NTRA TRISOMY 21 AGE-BASED RISK TEXT: NORMAL
NTRA TRISOMY 21 RESULT TEXT: NORMAL
NTRA TRISOMY 21 RISK SCORE TEXT: NORMAL

## 2024-12-31 LAB
Lab: NEGATIVE
Lab: NORMAL
NTRA ALPHA-THALASSEMIA: NEGATIVE
NTRA BETA-HEMOGLOBINOPATHIES: NEGATIVE
NTRA CANAVAN DISEASE: NEGATIVE
NTRA CYSTIC FIBROSIS: NEGATIVE
NTRA DUCHENNE/BECKER MUSCULAR DYSTROPHY: NEGATIVE
NTRA FAMILIAL DYSAUTONOMIA: NEGATIVE
NTRA FRAGILE X SYNDROME: NEGATIVE
NTRA GALACTOSEMIA: NEGATIVE
NTRA GAUCHER DISEASE: NEGATIVE
NTRA MEDIUM CHAIN ACYL-COA DEHYDROGENASE DEFICIENCY: NEGATIVE
NTRA POLYCYSTIC KIDNEY DISEASE, AUTOSOMAL RECESSIVE: NEGATIVE
NTRA SMITH-LEMLI-OPITZ SYNDROME: NEGATIVE
NTRA SPINAL MUSCULAR ATROPHY: NEGATIVE
NTRA TAY-SACHS DISEASE: NEGATIVE

## 2025-01-15 ENCOUNTER — ROUTINE PRENATAL (OUTPATIENT)
Dept: OBSTETRICS AND GYNECOLOGY | Age: 25
End: 2025-01-15
Payer: COMMERCIAL

## 2025-01-15 VITALS — SYSTOLIC BLOOD PRESSURE: 108 MMHG | DIASTOLIC BLOOD PRESSURE: 62 MMHG | BODY MASS INDEX: 35.91 KG/M2 | WEIGHT: 209.2 LBS

## 2025-01-15 DIAGNOSIS — Z34.02 SUPERVISION OF LOW-RISK FIRST PREGNANCY, SECOND TRIMESTER: ICD-10-CM

## 2025-01-15 DIAGNOSIS — R51.9 PREGNANCY HEADACHE IN SECOND TRIMESTER: ICD-10-CM

## 2025-01-15 DIAGNOSIS — O26.892 PREGNANCY HEADACHE IN SECOND TRIMESTER: ICD-10-CM

## 2025-01-15 DIAGNOSIS — Z34.82 ENCOUNTER FOR SUPERVISION OF OTHER NORMAL PREGNANCY IN SECOND TRIMESTER: Primary | ICD-10-CM

## 2025-01-15 LAB
CLARITY, POC: CLEAR
COLOR UR: YELLOW
GLUCOSE UR STRIP-MCNC: NEGATIVE MG/DL
PROT UR STRIP-MCNC: ABNORMAL MG/DL

## 2025-01-15 RX ORDER — MAGNESIUM OXIDE 400 MG/1
400 TABLET ORAL DAILY
Qty: 30 TABLET | Refills: 11 | Status: SHIPPED | OUTPATIENT
Start: 2025-01-15

## 2025-01-15 RX ORDER — CETIRIZINE HYDROCHLORIDE 10 MG/1
1 TABLET ORAL DAILY
COMMUNITY
Start: 2024-12-02

## 2025-01-15 NOTE — PROGRESS NOTES
Rossy Silva, a 24 y.o.  at 15w4d, presents for OB follow-up.  She is doing well today.  Denies loss of fluid, vaginal bleeding, and contractions.  Endorses fetal movements.    Objective  BP Readings from Last 3 Encounters:   01/15/25 108/62   24 112/78   24 126/82      Wt Readings from Last 3 Encounters:   01/15/25 94.9 kg (209 lb 3.2 oz)   24 95.2 kg (209 lb 12.8 oz)   24 96.6 kg (213 lb)      Total weight gain this pregnancy: 1.452 kg (3 lb 3.2 oz)    General: Awake, alert, no apparent distress  Respiratory: No increased work of breathing  Abdomen: Fundus soft and nontender  Extremity: Nontender, no edema    A/P  Diagnoses and all orders for this visit:    1. Encounter for supervision of other normal pregnancy in second trimester (Primary)  -     POC Urinalysis Dipstick    2. Supervision of low-risk first pregnancy, second trimester  Overview:  -PNL: IOB labs reviewed, O+ plan for GBS at 36wga  -Pap: completed 10/4/24 > normal   -Genetics: cfDNA/msAFP/carrier after 10 weeks, anatomy Us @20 weeks  -Imm: RI/VI, tdap after 20 weeks  -Contraception: _  -Birthplan: _  -Care coordination: accepts blood transfusions, no latex allergy, call schedule reviewed               Labor precautions reviewed  No follow-ups on file.    Supriya Sarmiento MD

## 2025-01-17 ENCOUNTER — TELEPHONE (OUTPATIENT)
Dept: OBSTETRICS AND GYNECOLOGY | Age: 25
End: 2025-01-17
Payer: COMMERCIAL

## 2025-01-17 NOTE — TELEPHONE ENCOUNTER
Provider: DR MOORE    Caller: TIESHA MONET    Relationship to Patient: Emergency Contact    Phone Number: [phone] / LVM    Reason for Call: PT'S  CALLING TO MAKE SURE THAT THE LETTER THEY REC'D IN THE MAIL FROM THE  OFFICE % ACCURATE - THAT THEIR INSURANCE WILL COVER 100% OF OB VISITS, DELIVERY FEE AND POSTPARTUM VISITS    PLEASE CALL TIESHA TO CONFIRM AND DISCUSS    THANK YOU!

## 2025-02-14 ENCOUNTER — ROUTINE PRENATAL (OUTPATIENT)
Dept: OBSTETRICS AND GYNECOLOGY | Age: 25
End: 2025-02-14
Payer: COMMERCIAL

## 2025-02-14 VITALS — WEIGHT: 216 LBS | SYSTOLIC BLOOD PRESSURE: 114 MMHG | DIASTOLIC BLOOD PRESSURE: 72 MMHG | BODY MASS INDEX: 37.08 KG/M2

## 2025-02-14 DIAGNOSIS — Z3A.19 19 WEEKS GESTATION OF PREGNANCY: ICD-10-CM

## 2025-02-14 DIAGNOSIS — O44.42 LOW-LYING PLACENTA IN SECOND TRIMESTER: ICD-10-CM

## 2025-02-14 DIAGNOSIS — Z34.02 SUPERVISION OF LOW-RISK FIRST PREGNANCY, SECOND TRIMESTER: Primary | ICD-10-CM

## 2025-02-14 LAB
GLUCOSE UR STRIP-MCNC: NEGATIVE MG/DL
PROT UR STRIP-MCNC: NEGATIVE MG/DL

## 2025-02-14 NOTE — PROGRESS NOTES
Chief Complaint   Patient presents with    Routine Prenatal Visit     19 weeks, US today  CC: no problems       HPI: 25 y.o.  at 19w6d     Doing well  Reports fetal movement  Denies LOF, bleeding or ctx's  Pt of Dr. Sarmiento    Vitals:    25 1054   BP: 114/72   Weight: 98 kg (216 lb)       ROS:  GI:  Negative  : Negative  Pulmonary: Negative     A/P  1. Intrauterine pregnancy at 19w6d   2. Pregnancy Risk:  NORMAL    Diagnoses and all orders for this visit:    1. Supervision of low-risk first pregnancy, second trimester (Primary)    2. Low-lying placenta in second trimester        -----------------------  PLAN:   Anatomy seen is normal but incomplete  Low lying placenta = 1.8 cm away from internal os  PTL warnings  Return in about 4 weeks (around 3/14/2025) for OB check, anatomy, recheck low lying placenta.      Albina Robison, APRN  2025 11:20 EST

## 2025-03-13 ENCOUNTER — ROUTINE PRENATAL (OUTPATIENT)
Dept: OBSTETRICS AND GYNECOLOGY | Age: 25
End: 2025-03-13
Payer: COMMERCIAL

## 2025-03-13 VITALS — DIASTOLIC BLOOD PRESSURE: 72 MMHG | BODY MASS INDEX: 38.17 KG/M2 | SYSTOLIC BLOOD PRESSURE: 112 MMHG | WEIGHT: 222.4 LBS

## 2025-03-13 DIAGNOSIS — O44.42 LOW-LYING PLACENTA IN SECOND TRIMESTER: ICD-10-CM

## 2025-03-13 DIAGNOSIS — Z34.82 ENCOUNTER FOR SUPERVISION OF OTHER NORMAL PREGNANCY IN SECOND TRIMESTER: Primary | ICD-10-CM

## 2025-03-13 DIAGNOSIS — Z34.02 SUPERVISION OF LOW-RISK FIRST PREGNANCY, SECOND TRIMESTER: ICD-10-CM

## 2025-03-13 RX ORDER — PRENATAL VIT NO.126/IRON/FOLIC 28MG-0.8MG
TABLET ORAL DAILY
COMMUNITY

## 2025-03-13 NOTE — PROGRESS NOTES
Rossy Silva, a 25 y.o.  at 23w5d, presents for OB follow-up.  She is doing well today.  Denies loss of fluid, vaginal bleeding, and contractions.  Endorses fetal movements.    Objective  BP Readings from Last 3 Encounters:   25 112/72   25 114/72   01/15/25 108/62      Wt Readings from Last 3 Encounters:   25 101 kg (222 lb 6.4 oz)   25 98 kg (216 lb)   01/15/25 94.9 kg (209 lb 3.2 oz)      Total weight gain this pregnancy: 7.439 kg (16 lb 6.4 oz)    General: Awake, alert, no apparent distress  Respiratory: No increased work of breathing  Abdomen: Fundus soft and nontender  Extremity: Nontender, no edema    A/P  Diagnoses and all orders for this visit:    1. Encounter for supervision of other normal pregnancy in second trimester (Primary)  -     POC Urinalysis Dipstick    2. Low-lying placenta in second trimester  Overview:  Resolved       3. Supervision of low-risk first pregnancy, second trimester  Overview:  -PNL: IOB labs reviewed, O+ plan for GBS at 36wga  -Pap: completed 10/4/24 > normal   -Genetics: cfDNA/carrier low risk, anatomy Us wnl   -Imm: RI/VI, tdap after 20 weeks  -Contraception: _  -Birthplan: _  -Care coordination: accepts blood transfusions, no latex allergy, call schedule reviewed               Labor precautions reviewed  Return in about 4 weeks (around 4/10/2025).    Supriya Sarmiento MD

## 2025-04-11 ENCOUNTER — ROUTINE PRENATAL (OUTPATIENT)
Dept: OBSTETRICS AND GYNECOLOGY | Age: 25
End: 2025-04-11
Payer: COMMERCIAL

## 2025-04-11 VITALS — BODY MASS INDEX: 39.14 KG/M2 | DIASTOLIC BLOOD PRESSURE: 72 MMHG | WEIGHT: 228 LBS | SYSTOLIC BLOOD PRESSURE: 120 MMHG

## 2025-04-11 DIAGNOSIS — Z36.9 ANTENATAL SCREENING ENCOUNTER: ICD-10-CM

## 2025-04-11 DIAGNOSIS — Z34.02 SUPERVISION OF LOW-RISK FIRST PREGNANCY, SECOND TRIMESTER: Primary | ICD-10-CM

## 2025-04-11 DIAGNOSIS — Z13.1 SCREENING FOR DIABETES MELLITUS: ICD-10-CM

## 2025-04-11 DIAGNOSIS — Z13.0 SCREENING FOR IRON DEFICIENCY ANEMIA: ICD-10-CM

## 2025-04-11 DIAGNOSIS — Z3A.27 27 WEEKS GESTATION OF PREGNANCY: ICD-10-CM

## 2025-04-11 LAB
GLUCOSE UR STRIP-MCNC: NEGATIVE MG/DL
PROT UR STRIP-MCNC: NEGATIVE MG/DL

## 2025-04-11 NOTE — PROGRESS NOTES
Chief Complaint   Patient presents with    Routine Prenatal Visit     27 weeks, Tdap needed  Cc:  no problems       HPI: 25 y.o.  at 27w6d     Doing well  Reports good FM  Denies LOF, bleeding or ctx's  Pt of Dr. Sarmiento    Vitals:    25 1001   BP: 120/72   Weight: 103 kg (228 lb)       ROS:  GI:  Negative  : Negative  Pulmonary: Negative     A/P  1. Intrauterine pregnancy at 27w6d   2. Pregnancy Risk:  NORMAL    Diagnoses and all orders for this visit:    1. Supervision of low-risk first pregnancy, second trimester (Primary)    2. Screening for diabetes mellitus  -     Gestational Screen 1 Hr ([facility])    3. Screening for iron deficiency anemia  -     CBC & Differential    4.  screening encounter  -     RPR, Rfx Qn RPR / Confirm TP    5. 27 weeks gestation of pregnancy  -     Gestational Screen 1 Hr ([facility])  -     POC Urinalysis Dipstick        -----------------------  PLAN:   1 hour GTT, CBC and syphilis screening  Tdap done  PTL/FKC discussed  Return in about 2 weeks (around 2025) for OB check with Dr Sarmiento.      Albina Robison, CHRISTOPHER  2025 10:17 EDT

## 2025-04-12 LAB
BASOPHILS # BLD AUTO: 0 X10E3/UL (ref 0–0.2)
BASOPHILS NFR BLD AUTO: 0 %
EOSINOPHIL # BLD AUTO: 0.1 X10E3/UL (ref 0–0.4)
EOSINOPHIL NFR BLD AUTO: 1 %
ERYTHROCYTE [DISTWIDTH] IN BLOOD BY AUTOMATED COUNT: 12 % (ref 11.7–15.4)
GLUCOSE 1H P 50 G GLC PO SERPL-MCNC: 114 MG/DL (ref 70–139)
HCT VFR BLD AUTO: 31.8 % (ref 34–46.6)
HGB BLD-MCNC: 10.3 G/DL (ref 11.1–15.9)
IMM GRANULOCYTES # BLD AUTO: 0.1 X10E3/UL (ref 0–0.1)
IMM GRANULOCYTES NFR BLD AUTO: 1 %
LYMPHOCYTES # BLD AUTO: 1 X10E3/UL (ref 0.7–3.1)
LYMPHOCYTES NFR BLD AUTO: 10 %
MCH RBC QN AUTO: 28.1 PG (ref 26.6–33)
MCHC RBC AUTO-ENTMCNC: 32.4 G/DL (ref 31.5–35.7)
MCV RBC AUTO: 87 FL (ref 79–97)
MONOCYTES # BLD AUTO: 0.6 X10E3/UL (ref 0.1–0.9)
MONOCYTES NFR BLD AUTO: 6 %
NEUTROPHILS # BLD AUTO: 8.2 X10E3/UL (ref 1.4–7)
NEUTROPHILS NFR BLD AUTO: 82 %
PLATELET # BLD AUTO: 334 X10E3/UL (ref 150–450)
RBC # BLD AUTO: 3.67 X10E6/UL (ref 3.77–5.28)
RPR SER QL: NON REACTIVE
WBC # BLD AUTO: 10 X10E3/UL (ref 3.4–10.8)

## 2025-04-24 ENCOUNTER — ROUTINE PRENATAL (OUTPATIENT)
Dept: OBSTETRICS AND GYNECOLOGY | Age: 25
End: 2025-04-24
Payer: COMMERCIAL

## 2025-04-24 VITALS — BODY MASS INDEX: 39.31 KG/M2 | WEIGHT: 229 LBS | DIASTOLIC BLOOD PRESSURE: 80 MMHG | SYSTOLIC BLOOD PRESSURE: 116 MMHG

## 2025-04-24 DIAGNOSIS — D50.9 IRON DEFICIENCY ANEMIA DURING PREGNANCY: ICD-10-CM

## 2025-04-24 DIAGNOSIS — O44.42 LOW-LYING PLACENTA IN SECOND TRIMESTER: ICD-10-CM

## 2025-04-24 DIAGNOSIS — Z34.93 SUPERVISION OF LOW-RISK PREGNANCY, THIRD TRIMESTER: ICD-10-CM

## 2025-04-24 DIAGNOSIS — O99.019 IRON DEFICIENCY ANEMIA DURING PREGNANCY: ICD-10-CM

## 2025-04-24 DIAGNOSIS — Z34.83 ENCOUNTER FOR SUPERVISION OF OTHER NORMAL PREGNANCY IN THIRD TRIMESTER: Primary | ICD-10-CM

## 2025-04-24 PROBLEM — Z34.90 SUPERVISION OF NORMAL PREGNANCY: Status: ACTIVE | Noted: 2025-04-24

## 2025-04-24 NOTE — PROGRESS NOTES
Rossy Silva, a 25 y.o.  at 29w5d, presents for OB follow-up.  She is doing well today.  Denies loss of fluid, vaginal bleeding, and contractions.  Endorses fetal movements.    Objective  BP Readings from Last 3 Encounters:   25 116/80   25 120/72   25 112/72      Wt Readings from Last 3 Encounters:   25 104 kg (229 lb)   25 103 kg (228 lb)   25 101 kg (222 lb 6.4 oz)      Total weight gain this pregnancy: 10.4 kg (23 lb)    General: Awake, alert, no apparent distress  Respiratory: No increased work of breathing  Abdomen: Fundus soft and nontender  Extremity: Nontender, no edema    A/P  Diagnoses and all orders for this visit:    1. Encounter for supervision of other normal pregnancy in third trimester (Primary)  -     POC Urinalysis Dipstick    2. Low-lying placenta in second trimester  Overview:  Resolved       3. Iron deficiency anemia during pregnancy    4. Supervision of low-risk pregnancy, third trimester  Overview:  -PNL: IOB labs reviewed, O+ plan for GBS at 36wga  -Pap: completed 10/4/24 > normal   -Genetics: cfDNA/carrier low risk, anatomy Us wnl   -Imm: RI/VI, tdap after 20 weeks  -Contraception: _  -Birthplan: _  -Care coordination: accepts blood transfusions, no latex allergy, call schedule reviewed               Labor precautions reviewed  No follow-ups on file.    Supriya Sarmiento MD

## 2025-05-08 ENCOUNTER — ROUTINE PRENATAL (OUTPATIENT)
Dept: OBSTETRICS AND GYNECOLOGY | Age: 25
End: 2025-05-08
Payer: COMMERCIAL

## 2025-05-08 VITALS — DIASTOLIC BLOOD PRESSURE: 70 MMHG | BODY MASS INDEX: 39.48 KG/M2 | WEIGHT: 230 LBS | SYSTOLIC BLOOD PRESSURE: 114 MMHG

## 2025-05-08 DIAGNOSIS — D50.9 IRON DEFICIENCY ANEMIA DURING PREGNANCY: ICD-10-CM

## 2025-05-08 DIAGNOSIS — O26.849 UTERINE SIZE DATE DISCREPANCY PREGNANCY: ICD-10-CM

## 2025-05-08 DIAGNOSIS — Z34.83 ENCOUNTER FOR SUPERVISION OF OTHER NORMAL PREGNANCY IN THIRD TRIMESTER: Primary | ICD-10-CM

## 2025-05-08 DIAGNOSIS — O44.42 LOW-LYING PLACENTA IN SECOND TRIMESTER: ICD-10-CM

## 2025-05-08 DIAGNOSIS — Z34.93 SUPERVISION OF LOW-RISK PREGNANCY, THIRD TRIMESTER: ICD-10-CM

## 2025-05-08 DIAGNOSIS — O99.019 IRON DEFICIENCY ANEMIA DURING PREGNANCY: ICD-10-CM

## 2025-05-08 PROBLEM — Z34.90 SUPERVISION OF NORMAL PREGNANCY: Status: RESOLVED | Noted: 2025-04-24 | Resolved: 2025-05-08

## 2025-05-08 LAB
GLUCOSE UR STRIP-MCNC: NEGATIVE MG/DL
PROT UR STRIP-MCNC: NEGATIVE MG/DL

## 2025-05-08 NOTE — PROGRESS NOTES
Rossy Silva, a 25 y.o.  at 31w5d, presents for OB follow-up.  She is doing well today.  Denies loss of fluid, vaginal bleeding, and contractions.  Endorses fetal movements.    Objective  BP Readings from Last 3 Encounters:   25 114/70   25 116/80   25 120/72      Wt Readings from Last 3 Encounters:   25 104 kg (230 lb)   25 104 kg (229 lb)   25 103 kg (228 lb)      Total weight gain this pregnancy: 10.9 kg (24 lb)    General: Awake, alert, no apparent distress  Respiratory: No increased work of breathing  Abdomen: Fundus soft and nontender  Extremity: Nontender, no edema    A/P  Diagnoses and all orders for this visit:    1. Encounter for supervision of other normal pregnancy in third trimester (Primary)  -     POC Urinalysis Dipstick    2. Low-lying placenta in second trimester  Overview:  Resolved       3. Supervision of low-risk pregnancy, third trimester  Overview:  -PNL: IOB labs reviewed, O+ plan for GBS at 36wga  -Pap: completed 10/4/24 > normal   -Genetics: cfDNA/carrier low risk, anatomy Us wnl   -Imm: RI/VI, tdap after 20 weeks  -Contraception: _  -Birthplan: _  -Care coordination: accepts blood transfusions, no latex allergy, call schedule reviewed           4. Iron deficiency anemia during pregnancy    5. Uterine size date discrepancy pregnancy  Comments:  -Measuring 34 cm at 31 weeks.   - Growth US scheduled for next visit.        Labor precautions reviewed  No follow-ups on file.    Supriya Sarmiento MD

## 2025-05-08 NOTE — PROGRESS NOTES
Rossy Silva, a 25 y.o.  at 31w5d, presents for OB follow-up.  She is doing well today.  Denies loss of fluid, vaginal bleeding, and contractions.  Endorses fetal movements.    Objective  BP Readings from Last 3 Encounters:   25 114/70   25 116/80   25 120/72      Wt Readings from Last 3 Encounters:   25 104 kg (230 lb)   25 104 kg (229 lb)   25 103 kg (228 lb)      Total weight gain this pregnancy: 10.9 kg (24 lb)    General: Awake, alert, no apparent distress  Respiratory: No increased work of breathing  Abdomen: Fundus soft and nontender  Extremity: Nontender, no edema    A/P  Diagnoses and all orders for this visit:    1. Encounter for supervision of other normal pregnancy in third trimester (Primary)  -     POC Urinalysis Dipstick    2. Low-lying placenta in second trimester  Overview:  Resolved       3. Supervision of low-risk pregnancy, third trimester  Overview:  -PNL: IOB labs reviewed, O+ plan for GBS at 36wga  -Pap: completed 10/4/24 > normal   -Genetics: cfDNA/carrier low risk, anatomy Us wnl   -Imm: RI/VI, tdap after 20 weeks  -Contraception: _  -Birthplan: _  -Care coordination: accepts blood transfusions, no latex allergy, call schedule reviewed           4. Iron deficiency anemia during pregnancy        Labor precautions reviewed  No follow-ups on file.    Supriya Sarmiento MD

## 2025-05-16 ENCOUNTER — TELEPHONE (OUTPATIENT)
Dept: OBSTETRICS AND GYNECOLOGY | Age: 25
End: 2025-05-16
Payer: COMMERCIAL

## 2025-05-16 NOTE — TELEPHONE ENCOUNTER
Spoke with pt she said that was a spot that happened two nights ago.  She said nothing since and baby is moving all the time, no bleeding, maybe one or two cramps, no contractions and she said if anything changes she will go to labor and delivery.

## 2025-05-22 ENCOUNTER — ROUTINE PRENATAL (OUTPATIENT)
Dept: OBSTETRICS AND GYNECOLOGY | Age: 25
End: 2025-05-22
Payer: COMMERCIAL

## 2025-05-22 VITALS — SYSTOLIC BLOOD PRESSURE: 134 MMHG | WEIGHT: 232.2 LBS | DIASTOLIC BLOOD PRESSURE: 90 MMHG | BODY MASS INDEX: 39.86 KG/M2

## 2025-05-22 DIAGNOSIS — O44.42 LOW-LYING PLACENTA IN SECOND TRIMESTER: ICD-10-CM

## 2025-05-22 DIAGNOSIS — O99.019 IRON DEFICIENCY ANEMIA DURING PREGNANCY: ICD-10-CM

## 2025-05-22 DIAGNOSIS — O26.849 UTERINE SIZE DATE DISCREPANCY PREGNANCY: ICD-10-CM

## 2025-05-22 DIAGNOSIS — Z34.83 ENCOUNTER FOR SUPERVISION OF OTHER NORMAL PREGNANCY IN THIRD TRIMESTER: Primary | ICD-10-CM

## 2025-05-22 DIAGNOSIS — D50.9 IRON DEFICIENCY ANEMIA DURING PREGNANCY: ICD-10-CM

## 2025-05-22 DIAGNOSIS — Z34.93 SUPERVISION OF LOW-RISK PREGNANCY, THIRD TRIMESTER: ICD-10-CM

## 2025-05-22 DIAGNOSIS — R03.0 ELEVATED BLOOD-PRESSURE READING WITHOUT DIAGNOSIS OF HYPERTENSION: ICD-10-CM

## 2025-05-22 NOTE — PROGRESS NOTES
Rossy Silva, a 25 y.o.  at 33w5d, presents for OB follow-up.  She is doing well today.  Denies loss of fluid, vaginal bleeding, and contractions.  Endorses fetal movements.    Objective  BP Readings from Last 3 Encounters:   25 134/90   25 114/70   25 116/80      Wt Readings from Last 3 Encounters:   25 105 kg (232 lb 3.2 oz)   25 104 kg (230 lb)   25 104 kg (229 lb)      Total weight gain this pregnancy: 11.9 kg (26 lb 3.2 oz)    General: Awake, alert, no apparent distress  Respiratory: No increased work of breathing  Abdomen: Fundus soft and nontender  Extremity: Nontender, no edema    A/P  Diagnoses and all orders for this visit:    1. Encounter for supervision of other normal pregnancy in third trimester (Primary)  -     POC Urinalysis Dipstick    2. Low-lying placenta in second trimester  Overview:  Resolved > confirmed on US 25      3. Supervision of low-risk pregnancy, third trimester  Overview:  -PNL: IOB labs reviewed, O+ plan for GBS at 36wga  -Pap: completed 10/4/24 > normal   -Genetics: cfDNA/carrier low risk, anatomy Us wnl   -Imm: RI/VI, tdap after 20 weeks  -Contraception: _  -Birthplan: _  -Care coordination: accepts blood transfusions, no latex allergy, call schedule reviewed           4. Uterine size date discrepancy pregnancy    5. Iron deficiency anemia during pregnancy      Would like 39 weeks induction > will schedule 2 weeks prior.   Labor precautions reviewed  No follow-ups on file.    Supriya Sarmiento MD

## 2025-05-23 LAB
ALBUMIN SERPL-MCNC: 3.6 G/DL (ref 3.5–5.2)
ALBUMIN/GLOB SERPL: 1.4 G/DL
ALP SERPL-CCNC: 106 U/L (ref 39–117)
ALT SERPL-CCNC: 10 U/L (ref 1–33)
AST SERPL-CCNC: 14 U/L (ref 1–32)
BASOPHILS # BLD AUTO: 0.03 10*3/MM3 (ref 0–0.2)
BASOPHILS NFR BLD AUTO: 0.3 % (ref 0–1.5)
BILIRUB SERPL-MCNC: 0.2 MG/DL (ref 0–1.2)
BUN SERPL-MCNC: 3 MG/DL (ref 6–20)
BUN/CREAT SERPL: 5.9 (ref 7–25)
CALCIUM SERPL-MCNC: 9.1 MG/DL (ref 8.6–10.5)
CHLORIDE SERPL-SCNC: 104 MMOL/L (ref 98–107)
CO2 SERPL-SCNC: 22 MMOL/L (ref 22–29)
CREAT SERPL-MCNC: 0.51 MG/DL (ref 0.57–1)
CREAT UR-MCNC: 58.1 MG/DL
EGFRCR SERPLBLD CKD-EPI 2021: 133.9 ML/MIN/1.73
EOSINOPHIL # BLD AUTO: 0.06 10*3/MM3 (ref 0–0.4)
EOSINOPHIL NFR BLD AUTO: 0.6 % (ref 0.3–6.2)
ERYTHROCYTE [DISTWIDTH] IN BLOOD BY AUTOMATED COUNT: 13.1 % (ref 12.3–15.4)
GLOBULIN SER CALC-MCNC: 2.5 GM/DL
GLUCOSE SERPL-MCNC: 110 MG/DL (ref 65–99)
HCT VFR BLD AUTO: 31.8 % (ref 34–46.6)
HGB BLD-MCNC: 10.9 G/DL (ref 12–15.9)
IMM GRANULOCYTES # BLD AUTO: 0.09 10*3/MM3 (ref 0–0.05)
IMM GRANULOCYTES NFR BLD AUTO: 0.8 % (ref 0–0.5)
LDH SERPL L TO P-CCNC: 145 U/L (ref 135–214)
LYMPHOCYTES # BLD AUTO: 1.12 10*3/MM3 (ref 0.7–3.1)
LYMPHOCYTES NFR BLD AUTO: 10.5 % (ref 19.6–45.3)
MCH RBC QN AUTO: 29.7 PG (ref 26.6–33)
MCHC RBC AUTO-ENTMCNC: 34.3 G/DL (ref 31.5–35.7)
MCV RBC AUTO: 86.6 FL (ref 79–97)
MONOCYTES # BLD AUTO: 0.59 10*3/MM3 (ref 0.1–0.9)
MONOCYTES NFR BLD AUTO: 5.6 % (ref 5–12)
NEUTROPHILS # BLD AUTO: 8.73 10*3/MM3 (ref 1.7–7)
NEUTROPHILS NFR BLD AUTO: 82.2 % (ref 42.7–76)
NRBC BLD AUTO-RTO: 0 /100 WBC (ref 0–0.2)
PLATELET # BLD AUTO: 286 10*3/MM3 (ref 140–450)
POTASSIUM SERPL-SCNC: 3.2 MMOL/L (ref 3.5–5.2)
PROT SERPL-MCNC: 6.1 G/DL (ref 6–8.5)
PROT UR-MCNC: 13.1 MG/DL
PROT/CREAT UR: 225.5 MG/G CREA (ref 0–200)
RBC # BLD AUTO: 3.67 10*6/MM3 (ref 3.77–5.28)
SODIUM SERPL-SCNC: 138 MMOL/L (ref 136–145)
WBC # BLD AUTO: 10.62 10*3/MM3 (ref 3.4–10.8)

## 2025-06-04 ENCOUNTER — ANESTHESIA (OUTPATIENT)
Dept: EMERGENCY DEPT | Facility: HOSPITAL | Age: 25
End: 2025-06-04
Payer: COMMERCIAL

## 2025-06-04 ENCOUNTER — HOSPITAL ENCOUNTER (INPATIENT)
Facility: HOSPITAL | Age: 25
LOS: 3 days | Discharge: HOME | End: 2025-06-07
Attending: STUDENT IN AN ORGANIZED HEALTH CARE EDUCATION/TRAINING PROGRAM | Admitting: OBSTETRICS & GYNECOLOGY
Payer: COMMERCIAL

## 2025-06-04 ENCOUNTER — TELEPHONE (OUTPATIENT)
Dept: OBSTETRICS AND GYNECOLOGY | Age: 25
End: 2025-06-04
Payer: COMMERCIAL

## 2025-06-04 ENCOUNTER — ANESTHESIA EVENT (OUTPATIENT)
Dept: EMERGENCY DEPT | Facility: HOSPITAL | Age: 25
End: 2025-06-04
Payer: COMMERCIAL

## 2025-06-04 PROBLEM — Z34.90 PREGNANCY: Status: ACTIVE | Noted: 2025-06-04

## 2025-06-04 LAB
A1 MICROGLOB PLACENTAL VAG QL: POSITIVE
ABO GROUP BLD: NORMAL
ALBUMIN SERPL-MCNC: 3.5 G/DL (ref 3.5–5.2)
ALBUMIN/GLOB SERPL: 1.1 G/DL
ALP SERPL-CCNC: 123 U/L (ref 39–117)
ALT SERPL W P-5'-P-CCNC: 8 U/L (ref 1–33)
ANION GAP SERPL CALCULATED.3IONS-SCNC: 12.4 MMOL/L (ref 5–15)
AST SERPL-CCNC: 14 U/L (ref 1–32)
BASOPHILS # BLD AUTO: 0.04 10*3/MM3 (ref 0–0.2)
BASOPHILS NFR BLD AUTO: 0.4 % (ref 0–1.5)
BILIRUB SERPL-MCNC: 0.2 MG/DL (ref 0–1.2)
BILIRUB UR QL STRIP: NEGATIVE
BLD GP AB SCN SERPL QL: NEGATIVE
BUN SERPL-MCNC: 3 MG/DL (ref 6–20)
BUN/CREAT SERPL: 5.7 (ref 7–25)
CALCIUM SPEC-SCNC: 9 MG/DL (ref 8.6–10.5)
CHLORIDE SERPL-SCNC: 103 MMOL/L (ref 98–107)
CLARITY UR: CLEAR
CO2 SERPL-SCNC: 20.6 MMOL/L (ref 22–29)
COLOR UR: YELLOW
CREAT SERPL-MCNC: 0.53 MG/DL (ref 0.57–1)
DEPRECATED RDW RBC AUTO: 43.5 FL (ref 37–54)
EGFRCR SERPLBLD CKD-EPI 2021: 132.6 ML/MIN/1.73
EOSINOPHIL # BLD AUTO: 0.08 10*3/MM3 (ref 0–0.4)
EOSINOPHIL NFR BLD AUTO: 0.8 % (ref 0.3–6.2)
ERYTHROCYTE [DISTWIDTH] IN BLOOD BY AUTOMATED COUNT: 13.3 % (ref 12.3–15.4)
EXTERNAL GROUP B STREP ANTIGEN: NORMAL
GLOBULIN UR ELPH-MCNC: 3.1 GM/DL
GLUCOSE SERPL-MCNC: 77 MG/DL (ref 65–99)
GLUCOSE UR STRIP-MCNC: NEGATIVE MG/DL
HCT VFR BLD AUTO: 33.9 % (ref 34–46.6)
HGB BLD-MCNC: 10.9 G/DL (ref 12–15.9)
HGB UR QL STRIP.AUTO: NEGATIVE
IMM GRANULOCYTES # BLD AUTO: 0.13 10*3/MM3 (ref 0–0.05)
IMM GRANULOCYTES NFR BLD AUTO: 1.3 % (ref 0–0.5)
KETONES UR QL STRIP: NEGATIVE
LEUKOCYTE ESTERASE UR QL STRIP.AUTO: NEGATIVE
LYMPHOCYTES # BLD AUTO: 1.08 10*3/MM3 (ref 0.7–3.1)
LYMPHOCYTES NFR BLD AUTO: 10.6 % (ref 19.6–45.3)
MCH RBC QN AUTO: 28.6 PG (ref 26.6–33)
MCHC RBC AUTO-ENTMCNC: 32.2 G/DL (ref 31.5–35.7)
MCV RBC AUTO: 89 FL (ref 79–97)
MONOCYTES # BLD AUTO: 0.73 10*3/MM3 (ref 0.1–0.9)
MONOCYTES NFR BLD AUTO: 7.1 % (ref 5–12)
NEUTROPHILS NFR BLD AUTO: 79.8 % (ref 42.7–76)
NEUTROPHILS NFR BLD AUTO: 8.16 10*3/MM3 (ref 1.7–7)
NITRITE UR QL STRIP: NEGATIVE
NRBC BLD AUTO-RTO: 0 /100 WBC (ref 0–0.2)
PH UR STRIP.AUTO: 7.5 [PH] (ref 5–8)
PLATELET # BLD AUTO: 273 10*3/MM3 (ref 140–450)
PMV BLD AUTO: 9.9 FL (ref 6–12)
POTASSIUM SERPL-SCNC: 3.2 MMOL/L (ref 3.5–5.2)
PROT SERPL-MCNC: 6.6 G/DL (ref 6–8.5)
PROT UR QL STRIP: ABNORMAL
RBC # BLD AUTO: 3.81 10*6/MM3 (ref 3.77–5.28)
RH BLD: POSITIVE
SODIUM SERPL-SCNC: 136 MMOL/L (ref 136–145)
SP GR UR STRIP: 1.01 (ref 1–1.03)
T&S EXPIRATION DATE: NORMAL
TREPONEMA PALLIDUM IGG+IGM AB [PRESENCE] IN SERUM OR PLASMA BY IMMUNOASSAY: NORMAL
UROBILINOGEN UR QL STRIP: ABNORMAL
WBC NRBC COR # BLD AUTO: 10.22 10*3/MM3 (ref 3.4–10.8)

## 2025-06-04 PROCEDURE — C1755 CATHETER, INTRASPINAL: HCPCS | Performed by: ANESTHESIOLOGY

## 2025-06-04 PROCEDURE — 85025 COMPLETE CBC W/AUTO DIFF WBC: CPT | Performed by: OBSTETRICS & GYNECOLOGY

## 2025-06-04 PROCEDURE — 25010000002 LIDOCAINE-EPINEPHRINE (PF) 1 %-1:200000 SOLUTION: Performed by: ANESTHESIOLOGY

## 2025-06-04 PROCEDURE — 99202 OFFICE O/P NEW SF 15 MIN: CPT | Performed by: OBSTETRICS & GYNECOLOGY

## 2025-06-04 PROCEDURE — 81003 URINALYSIS AUTO W/O SCOPE: CPT | Performed by: OBSTETRICS & GYNECOLOGY

## 2025-06-04 PROCEDURE — 86850 RBC ANTIBODY SCREEN: CPT | Performed by: OBSTETRICS & GYNECOLOGY

## 2025-06-04 PROCEDURE — 80053 COMPREHEN METABOLIC PANEL: CPT | Performed by: OBSTETRICS & GYNECOLOGY

## 2025-06-04 PROCEDURE — 25810000003 LACTATED RINGERS PER 1000 ML: Performed by: OBSTETRICS & GYNECOLOGY

## 2025-06-04 PROCEDURE — 84112 EVAL AMNIOTIC FLUID PROTEIN: CPT | Performed by: OBSTETRICS & GYNECOLOGY

## 2025-06-04 PROCEDURE — 86900 BLOOD TYPING SEROLOGIC ABO: CPT | Performed by: OBSTETRICS & GYNECOLOGY

## 2025-06-04 PROCEDURE — 25010000002 PENICILLIN G POTASSIUM PER 600000 UNITS: Performed by: OBSTETRICS & GYNECOLOGY

## 2025-06-04 PROCEDURE — 86901 BLOOD TYPING SEROLOGIC RH(D): CPT | Performed by: OBSTETRICS & GYNECOLOGY

## 2025-06-04 PROCEDURE — 86780 TREPONEMA PALLIDUM: CPT | Performed by: OBSTETRICS & GYNECOLOGY

## 2025-06-04 RX ORDER — SODIUM CHLORIDE 0.9 % (FLUSH) 0.9 %
10 SYRINGE (ML) INJECTION EVERY 12 HOURS SCHEDULED
Status: DISCONTINUED | OUTPATIENT
Start: 2025-06-04 | End: 2025-06-05 | Stop reason: HOSPADM

## 2025-06-04 RX ORDER — BUTORPHANOL TARTRATE 2 MG/ML
2 INJECTION, SOLUTION INTRAMUSCULAR; INTRAVENOUS
Status: DISCONTINUED | OUTPATIENT
Start: 2025-06-04 | End: 2025-06-05 | Stop reason: HOSPADM

## 2025-06-04 RX ORDER — SODIUM CHLORIDE 9 MG/ML
40 INJECTION, SOLUTION INTRAVENOUS AS NEEDED
Status: DISCONTINUED | OUTPATIENT
Start: 2025-06-04 | End: 2025-06-05 | Stop reason: HOSPADM

## 2025-06-04 RX ORDER — ONDANSETRON 4 MG/1
4 TABLET, ORALLY DISINTEGRATING ORAL EVERY 6 HOURS PRN
Status: DISCONTINUED | OUTPATIENT
Start: 2025-06-04 | End: 2025-06-05 | Stop reason: HOSPADM

## 2025-06-04 RX ORDER — MAGNESIUM CARB/ALUMINUM HYDROX 105-160MG
30 TABLET,CHEWABLE ORAL ONCE AS NEEDED
Status: COMPLETED | OUTPATIENT
Start: 2025-06-04 | End: 2025-06-05

## 2025-06-04 RX ORDER — FAMOTIDINE 10 MG/ML
20 INJECTION, SOLUTION INTRAVENOUS ONCE AS NEEDED
Status: DISCONTINUED | OUTPATIENT
Start: 2025-06-04 | End: 2025-06-05 | Stop reason: HOSPADM

## 2025-06-04 RX ORDER — LIDOCAINE HYDROCHLORIDE 10 MG/ML
0.5 INJECTION, SOLUTION INFILTRATION; PERINEURAL ONCE AS NEEDED
Status: DISCONTINUED | OUTPATIENT
Start: 2025-06-04 | End: 2025-06-05 | Stop reason: HOSPADM

## 2025-06-04 RX ORDER — FENTANYL/ROPIVACAINE/NS/PF 2MCG/ML-.2
10 PLASTIC BAG, INJECTION (ML) INJECTION CONTINUOUS
Refills: 0 | Status: DISCONTINUED | OUTPATIENT
Start: 2025-06-04 | End: 2025-06-05

## 2025-06-04 RX ORDER — FAMOTIDINE 10 MG/ML
20 INJECTION, SOLUTION INTRAVENOUS 2 TIMES DAILY PRN
Status: DISCONTINUED | OUTPATIENT
Start: 2025-06-04 | End: 2025-06-05 | Stop reason: HOSPADM

## 2025-06-04 RX ORDER — ONDANSETRON 2 MG/ML
4 INJECTION INTRAMUSCULAR; INTRAVENOUS EVERY 6 HOURS PRN
Status: DISCONTINUED | OUTPATIENT
Start: 2025-06-04 | End: 2025-06-05 | Stop reason: HOSPADM

## 2025-06-04 RX ORDER — SODIUM CHLORIDE, SODIUM LACTATE, POTASSIUM CHLORIDE, CALCIUM CHLORIDE 600; 310; 30; 20 MG/100ML; MG/100ML; MG/100ML; MG/100ML
125 INJECTION, SOLUTION INTRAVENOUS CONTINUOUS
Status: DISCONTINUED | OUTPATIENT
Start: 2025-06-04 | End: 2025-06-05

## 2025-06-04 RX ORDER — ONDANSETRON 2 MG/ML
4 INJECTION INTRAMUSCULAR; INTRAVENOUS ONCE AS NEEDED
Status: COMPLETED | OUTPATIENT
Start: 2025-06-04 | End: 2025-06-05

## 2025-06-04 RX ORDER — ACETAMINOPHEN 325 MG/1
650 TABLET ORAL EVERY 4 HOURS PRN
Status: DISCONTINUED | OUTPATIENT
Start: 2025-06-04 | End: 2025-06-05 | Stop reason: HOSPADM

## 2025-06-04 RX ORDER — OXYTOCIN/0.9 % SODIUM CHLORIDE 30/500 ML
2-20 PLASTIC BAG, INJECTION (ML) INTRAVENOUS
Status: DISCONTINUED | OUTPATIENT
Start: 2025-06-04 | End: 2025-06-05 | Stop reason: HOSPADM

## 2025-06-04 RX ORDER — PENICILLIN G 3000000 [IU]/50ML
3 INJECTION, SOLUTION INTRAVENOUS EVERY 4 HOURS
Status: DISCONTINUED | OUTPATIENT
Start: 2025-06-04 | End: 2025-06-05 | Stop reason: HOSPADM

## 2025-06-04 RX ORDER — TERBUTALINE SULFATE 1 MG/ML
0.25 INJECTION SUBCUTANEOUS AS NEEDED
Status: DISCONTINUED | OUTPATIENT
Start: 2025-06-04 | End: 2025-06-05 | Stop reason: HOSPADM

## 2025-06-04 RX ORDER — SODIUM CHLORIDE 0.9 % (FLUSH) 0.9 %
10 SYRINGE (ML) INJECTION AS NEEDED
Status: DISCONTINUED | OUTPATIENT
Start: 2025-06-04 | End: 2025-06-05 | Stop reason: HOSPADM

## 2025-06-04 RX ORDER — FAMOTIDINE 20 MG/1
20 TABLET, FILM COATED ORAL 2 TIMES DAILY PRN
Status: DISCONTINUED | OUTPATIENT
Start: 2025-06-04 | End: 2025-06-05 | Stop reason: HOSPADM

## 2025-06-04 RX ORDER — DIPHENHYDRAMINE HYDROCHLORIDE 50 MG/ML
12.5 INJECTION, SOLUTION INTRAMUSCULAR; INTRAVENOUS EVERY 8 HOURS PRN
Status: DISCONTINUED | OUTPATIENT
Start: 2025-06-04 | End: 2025-06-05 | Stop reason: HOSPADM

## 2025-06-04 RX ORDER — EPHEDRINE SULFATE 50 MG/ML
5 INJECTION, SOLUTION INTRAVENOUS
Status: DISCONTINUED | OUTPATIENT
Start: 2025-06-04 | End: 2025-06-05 | Stop reason: HOSPADM

## 2025-06-04 RX ADMIN — LIDOCAINE HYDROCHLORIDE 3 ML: 10; .005 INJECTION, SOLUTION EPIDURAL; INFILTRATION; INTRACAUDAL; PERINEURAL at 21:50

## 2025-06-04 RX ADMIN — Medication 2 MILLI-UNITS/MIN: at 14:40

## 2025-06-04 RX ADMIN — Medication 8 ML/HR: at 21:50

## 2025-06-04 RX ADMIN — SODIUM CHLORIDE 5 MILLION UNITS: 900 INJECTION INTRAVENOUS at 12:47

## 2025-06-04 RX ADMIN — LIDOCAINE HYDROCHLORIDE 7 ML: 10; .005 INJECTION, SOLUTION EPIDURAL; INFILTRATION; INTRACAUDAL; PERINEURAL at 21:47

## 2025-06-04 RX ADMIN — SODIUM CHLORIDE, POTASSIUM CHLORIDE, SODIUM LACTATE AND CALCIUM CHLORIDE 125 ML/HR: 600; 310; 30; 20 INJECTION, SOLUTION INTRAVENOUS at 21:53

## 2025-06-04 RX ADMIN — SODIUM CHLORIDE, POTASSIUM CHLORIDE, SODIUM LACTATE AND CALCIUM CHLORIDE 125 ML/HR: 600; 310; 30; 20 INJECTION, SOLUTION INTRAVENOUS at 12:47

## 2025-06-04 RX ADMIN — SODIUM CHLORIDE, POTASSIUM CHLORIDE, SODIUM LACTATE AND CALCIUM CHLORIDE 125 ML/HR: 600; 310; 30; 20 INJECTION, SOLUTION INTRAVENOUS at 15:58

## 2025-06-04 RX ADMIN — PENICILLIN G 3 MILLION UNITS: 3000000 INJECTION, SOLUTION INTRAVENOUS at 16:23

## 2025-06-04 RX ADMIN — PENICILLIN G 3 MILLION UNITS: 3000000 INJECTION, SOLUTION INTRAVENOUS at 20:45

## 2025-06-04 NOTE — TELEPHONE ENCOUNTER
Pt is 35w4d calling c/o leaking fluid. Pt stating she has been leaking small amounts of fluid x's 3 days. Pt stating it has progressively gotten worse. Pt stated at first it was just a few droplets here & there and was unsure if it could be urine. Pt stating she now knows for sure it is not urine & has soaked through her underwear 3 times within the last 8 hours. Pt stating she has not felt much movement from baby as well but stated that its normal for her this time of day. Pt denied any severe pelvic pain or vaginal bleeding. Pt advised to proceed to L&D. Pt lives in [facility] & prefers to go to [facility] in [facility] as it is much closer. I advised we prefer her to go to [facility] if possible but understand if pt cannot. Pt stated she will speak with her  & call right back to let us know which hospital she plans to go to.

## 2025-06-04 NOTE — TELEPHONE ENCOUNTER
Pt returned call. Pt stating she is going to proceed to [facility], [facility] for now & will call back if anything changes. Pt has no further questions at this time.Sending to provider as an FYI.

## 2025-06-04 NOTE — OBED NOTES
[facility] Note [facility] [place]    Patient Name: Rossy Silva  YOB: 2000  MRN: [id number]  Admission Date: 2025 10:22 AM  Date of Service: 2025    Chief Complaint: Leaking Fluid (Pt presents to [facility] with leaking fluid since  night. Endorses +FM. Denies VB. Denies ctx. VSS.)        Subjective     Rossy Silva is a 25 y.o. female  at 35w4d with Estimated Date of Delivery: 25 who presents with the chief complaint listed above.  Patient reports heavy vaginal discharge for the last week but specifically the last 2 days has increased in volume to where it was soaking her panties.     She sees Supriya Sarmiento, * for her prenatal care. Her pregnancy has been complicated by: Anemia, migraines, anxiety depression    She describes fetal movement as normal.  She admits to rupture of membranes.  She denies vaginal bleeding. She is feeling contractions.        Objective   Patient Active Problem List    Diagnosis     Uterine size date discrepancy pregnancy [O26.849]     Iron deficiency anemia during pregnancy [O99.019, D50.9]     Low-lying placenta in second trimester [O44.42]     Supervision of low-risk pregnancy, third trimester [Z34.93]         OB History    Para Term  AB Living   2 0 0 0 1 0   SAB IAB Ectopic Molar Multiple Live Births   1 0 0 0 0 0      # Outcome Date GA Lbr Cisco/2nd Weight Sex Type Anes PTL Lv   2 Current            1 SAB      SAB           Past Medical History:   Diagnosis Date    Anxiety     Migraine 3-5 years ago    Ovarian cyst 1-2 years ago       Past Surgical History:   Procedure Laterality Date    TONSILLECTOMY      WISDOM TOOTH EXTRACTION         No current facility-administered medications on file prior to encounter.     Current Outpatient Medications on File Prior to Encounter   Medication Sig Dispense Refill    doxylamine (UNISOM) 25 MG tablet Take 1 tablet by mouth At Night As Needed for Sleep. (Patient not taking: Reported on 2025)       prenatal vitamin (prenatal, CLASSIC, vitamin) tablet Take  by mouth Daily.         No Known Allergies    Family History   Problem Relation Age of Onset    Coronary artery disease Father     Hypertension Father     Diabetes Brother     Breast cancer Neg Hx     Uterine cancer Neg Hx     Ovarian cancer Neg Hx     Colon cancer Neg Hx        Social History     Socioeconomic History    Marital status:    Tobacco Use    Smoking status: Never    Smokeless tobacco: Never    Tobacco comments:     n/a   Vaping Use    Vaping status: Never Used   Substance and Sexual Activity    Alcohol use: Not Currently     Comment: n/a    Drug use: Never    Sexual activity: Yes     Partners: Male     Birth control/protection: None, Natural family planning/Rhythm           Review of Systems   Constitutional:  Negative for chills and fever.   HENT: Negative.     Eyes:  Negative for photophobia and visual disturbance.   Respiratory:  Negative for shortness of breath.    Cardiovascular:  Negative for chest pain.   Gastrointestinal:  Negative for nausea.   Genitourinary:  Positive for vaginal discharge.   Psychiatric/Behavioral:  The patient is not nervous/anxious.           PHYSICAL EXAM:      VITAL SIGNS:  Vitals:    06/04/25 1044   BP: 125/72   Pulse: 90   Resp: 12   Temp: 98.2 °F (36.8 °C)   TempSrc: Oral   SpO2: 100%            FHT'S:    140 with moderate variability and accelerations                                     PHYSICAL EXAM:      General: well developed; well nourished  no acute distress  mentation appropriate   Heart: Not performed.   Lungs   breathing is unlabored   Abdomen: Gravid and non tender     Extremities: trace edema, DTRs 1 plus, no clonus       Cervix: Per RN  Cervical Dilation (cm): 1-2  Cervical Effacement: 50  Fetal Station: -2  Cervical Consistency: medium  Cervical Position: posterior     Contractions:   irregular                    LABS AND TESTING ORDERED:  Uterine and fetal monitoring  Urinalysis  ROM  "plus, admit labs    LAB RESULTS:    Recent Results (from the past 24 hours)   Urinalysis With Microscopic If Indicated (No Culture) - Urine, Clean Catch    Collection Time: 06/04/25 11:33 AM    Specimen: Urine, Clean Catch   Result Value Ref Range    Color, UA Yellow Yellow, Straw    Appearance, UA Clear Clear    pH, UA 7.5 5.0 - 8.0    Specific Gravity, UA 1.010 1.005 - 1.030    Glucose, UA Negative Negative    Ketones, UA Negative Negative    Bilirubin, UA Negative Negative    Blood, UA Negative Negative    Protein, UA Trace (A) Negative    Leuk Esterase, UA Negative Negative    Nitrite, UA Negative Negative    Urobilinogen, UA 0.2 E.U./dL 0.2 - 1.0 E.U./dL   Rapid Assay For ROM - Amniotic Fluid, Amniotic Sac    Collection Time: 06/04/25 11:34 AM    Specimen: Amniotic Sac; Amniotic Fluid   Result Value Ref Range    Rupture of Membranes Positive (C) Negative       Lab Results   Component Value Date    ABO O 11/20/2024    RH Positive 11/20/2024       No results found for: \"STREPGPB\"              External Prenatal Results       Pregnancy Outside Results - Transcribed From Office Records - See Scanned Records For Details       Test Value Date Time    ABO  O  11/20/24 0932    Rh  Positive  11/20/24 0932    Antibody Screen  Negative  11/20/24 0932    Varicella IgG  Reactive  11/20/24 0932    Rubella  1.39 index 11/20/24 0932    Hgb  10.9 g/dL 05/22/25 1434       10.3 g/dL 04/11/25 1109       12.6 g/dL 11/20/24 0932    Hct  31.8 % 05/22/25 1434       31.8 % 04/11/25 1109       39.7 % 11/20/24 0932    HgB A1c   5.00 % 11/20/24 0932    1h GTT  114 mg/dL 04/11/25 1109    3h GTT Fasting       3h GTT 1 hour       3h GTT 2 hour       3h GTT 3 hour        Gonorrhea (discrete)  Negative  11/20/24 1019    Chlamydia (discrete)  Negative  11/20/24 1019    RPR  Non Reactive  04/11/25 1109       Non-Reactive  11/20/24 0932    Syphils cascade: TP-Ab (FTA)       TP-Ab       TP-Ab (EIA)       TPPA       HBsAg  Non-Reactive  11/20/24 " 0932    Herpes Simplex Virus PCR       Herpes Simplex VIrus Culture       HIV  Non-Reactive  24 0932    Hep C RNA Quant PCR       Hep C Antibody  Non-Reactive  24 0932    AFP       NIPT       Cystic Fibrosis (Noemi)  Negative  24 1016    Cystic Fibroisis        Spinal Muscular atrophy  Negative  24 1016    Fragile X       Group B Strep       GBS Susceptibility to Clindamycin       GBS Susceptibility to Erythromycin       Fetal Fibronectin       Genetic Testing, Maternal Blood                 Drug Screening       Test Value Date Time    Urine Drug Screen       Amphetamine Screen       Barbiturate Screen       Benzodiazepine Screen       Methadone Screen       Phencyclidine Screen       Opiates Screen       THC Screen       Cocaine Screen       Propoxyphene Screen       Buprenorphine Screen       Methamphetamine Screen       Oxycodone Screen       Tricyclic Antidepressants Screen                 Legend    ^: Historical                              Impression:   @ 35w4d .  Final Diagnosis: P PROM, unknown group B beta strep status    Plan:  1.  Discussed with Dr. Conklin who will admit, fetal and uterine monitoring  continuously, labor augmentation  Pitocin, analgesia with  epidural, and antibiotic for GBS        Adriana Beltre MD  2025  11:52 EDT

## 2025-06-04 NOTE — H&P
[facility]  Obstetric History and Physical    Chief Complaint   Patient presents with    Leaking Fluid     Pt presents to [facility] with leaking fluid since  night. Endorses +FM. Denies VB. Denies ctx. VSS.       Subjective     Patient is a 25 y.o. female  currently at 35w4d, who presents with  premature rupture of membranes. She denies painful ctx or vaginal bleeding. She is feeling fetal movement. She denies fever/chills.     Her prenatal care is complicated by   Patient Active Problem List   Diagnosis    Supervision of low-risk pregnancy, third trimester    Low-lying placenta in second trimester    Iron deficiency anemia during pregnancy    Uterine size date discrepancy pregnancy    Pregnancy     premature rupture of membranes (PPROM) with onset of labor within 24 hours of rupture in third trimester, antepartum      Her previous obstetric/gynecological history is noted for SAB X 1    The following portions of the patients history were reviewed and updated as appropriate: current medications, allergies, past medical history, past surgical history, past family history, past social history, and problem list .       Prenatal Information:  Prenatal Results       Initial Prenatal Labs       Test Value Reference Range Date Time    Hemoglobin  12.6 g/dL 12.0 - 15.9 24 0932    Hematocrit  39.7 % 34.0 - 46.6 24 0932    Platelets  257 10*3/mm3 140 - 450 24 0932    Rubella IgG  1.39 index Immune >0.99 24 0932    Hepatitis B SAg  Non-Reactive  Non-Reactive 24 0932    Hepatitis C Ab  Non-Reactive  Non-Reactive 24 0932    RPR  Non Reactive  Non Reactive 25 1109       Non-Reactive  Non-Reactive 24 0932    T. Pallidum Ab   Non-Reactive  Non-Reactive 25 1248    ABO  O   25 1248    Rh  Positive   25 1248    Antibody Screen  Negative   24 0932    HIV  Non-Reactive  Non-Reactive 24 0932    Urine Culture  <10,000 CFU/mL Normal  Urogenital Kay   11/20/24 1019    Gonorrhea  Negative  Negative 11/20/24 1019    Chlamydia  Negative  Negative 11/20/24 1019    TSH        HgB A1c   5.00 % 4.80 - 5.60 11/20/24 0932    Varicella IgG  Reactive  Non Reactive 11/20/24 0932    Hemoglobinopathy Fractionation        Hemoglobinopathy (genetic testing)        Cystic fibrosis   Negative   12/18/24 1016    Spinal muscular atrophy  Negative   12/18/24 1016    Fragile X                  Fetal testing        Test Value Reference Range Date Time    NIPT        MSAFP        AFP-4                  2nd and 3rd Trimester       Test Value Reference Range Date Time    Hemoglobin (repeated)  10.9 g/dL 12.0 - 15.9 06/04/25 1248       10.9 g/dL 12.0 - 15.9 05/22/25 1434       10.3 g/dL 11.1 - 15.9 04/11/25 1109    Hematocrit (repeated)  33.9 % 34.0 - 46.6 06/04/25 1248       31.8 % 34.0 - 46.6 05/22/25 1434       31.8 % 34.0 - 46.6 04/11/25 1109    Platelets   273 10*3/mm3 140 - 450 06/04/25 1248       286 10*3/mm3 140 - 450 05/22/25 1434       334 x10E3/uL 150 - 450 04/11/25 1109       257 10*3/mm3 140 - 450 11/20/24 0932    1 hour GTT   114 mg/dL 70 - 139 04/11/25 1109    Antibody Screen (repeated)  Negative   06/04/25 1248    3rd TM syphilis scrn (repeated)  RPR   Non Reactive  Non Reactive 04/11/25 1109    3rd TM syphilis scrn (repeated) TP-Ab  Non-Reactive  Non-Reactive 06/04/25 1248    3rd TM syphilis screen TB-Ab (FTA)  Non-Reactive  Non-Reactive 06/04/25 1248    Syphilis cascade test TP-Ab (EIA)        Syphilis cascade TPPA        GTT Fasting        GTT 1 Hr        GTT 2 Hr        GTT 3 Hr        Group B Strep ^ Unknown   06/04/25               Other testing        Test Value Reference Range Date Time    Parvo IgG         CMV IgG                   Drug Screening       Test Value Reference Range Date Time    Amphetamine Screen        Barbiturate Screen        Benzodiazepine Screen        Methadone Screen        Phencyclidine Screen        Opiates Screen         THC Screen        Cocaine Screen        Propoxyphene Screen        Buprenorphine Screen        Methamphetamine Screen        Oxycodone Screen        Tricyclic Antidepressants Screen                  Legend    ^: Historical                          External Prenatal Results       Pregnancy Outside Results - Transcribed From Office Records - See Scanned Records For Details       Test Value Date Time    ABO  O  06/04/25 1248    Rh  Positive  06/04/25 1248    Antibody Screen  Negative  06/04/25 1248       Negative  11/20/24 0932    Varicella IgG  Reactive  11/20/24 0932    Rubella  1.39 index 11/20/24 0932    Hgb  10.9 g/dL 06/04/25 1248       10.9 g/dL 05/22/25 1434       10.3 g/dL 04/11/25 1109       12.6 g/dL 11/20/24 0932    Hct  33.9 % 06/04/25 1248       31.8 % 05/22/25 1434       31.8 % 04/11/25 1109       39.7 % 11/20/24 0932    HgB A1c   5.00 % 11/20/24 0932    1h GTT  114 mg/dL 04/11/25 1109    3h GTT Fasting       3h GTT 1 hour       3h GTT 2 hour       3h GTT 3 hour        Gonorrhea (discrete)  Negative  11/20/24 1019    Chlamydia (discrete)  Negative  11/20/24 1019    RPR  Non Reactive  04/11/25 1109       Non-Reactive  11/20/24 0932    Syphils cascade: TP-Ab (FTA)  Non-Reactive  06/04/25 1248    TP-Ab  Non-Reactive  06/04/25 1248    TP-Ab (EIA)       TPPA       HBsAg  Non-Reactive  11/20/24 0932    Herpes Simplex Virus PCR       Herpes Simplex VIrus Culture       HIV  Non-Reactive  11/20/24 0932    Hep C RNA Quant PCR       Hep C Antibody  Non-Reactive  11/20/24 0932    AFP       NIPT       Cystic Fibrosis (Noemi)  Negative  12/18/24 1016    Cystic Fibroisis        Spinal Muscular atrophy  Negative  12/18/24 1016    Fragile X       Group B Strep ^ Unknown  06/04/25     GBS Susceptibility to Clindamycin       GBS Susceptibility to Erythromycin       Fetal Fibronectin       Genetic Testing, Maternal Blood                 Drug Screening       Test Value Date Time    Urine Drug Screen       Amphetamine Screen        Barbiturate Screen       Benzodiazepine Screen       Methadone Screen       Phencyclidine Screen       Opiates Screen       THC Screen       Cocaine Screen       Propoxyphene Screen       Buprenorphine Screen       Methamphetamine Screen       Oxycodone Screen       Tricyclic Antidepressants Screen                 Legend    ^: Historical                             Past OB History:     OB History    Para Term  AB Living   2 0 0 0 1 0   SAB IAB Ectopic Molar Multiple Live Births   1 0 0 0 0 0      # Outcome Date GA Lbr Cisco/2nd Weight Sex Type Anes PTL Lv   2 Current            1 SAB      SAB          Past Medical History: Past Medical History:   Diagnosis Date    Anxiety     Migraine 3-5 years ago    Ovarian cyst 1-2 years ago    Pregnancy 2025      Past Surgical History Past Surgical History:   Procedure Laterality Date    TONSILLECTOMY      WISDOM TOOTH EXTRACTION        Family History: Family History   Problem Relation Age of Onset    Coronary artery disease Father     Hypertension Father     Diabetes Brother     Breast cancer Neg Hx     Uterine cancer Neg Hx     Ovarian cancer Neg Hx     Colon cancer Neg Hx       Social History:  reports that she has never smoked. She has never used smokeless tobacco.   reports that she does not currently use alcohol.   reports no history of drug use.        General ROS: Pertinent items are noted in HPI    Objective       Vital Signs Range for the last 24 hours  Temperature: Temp:  [98.2 °F (36.8 °C)] 98.2 °F (36.8 °C)   Temp Source: Temp src: Oral   BP: BP: (125)/(72) 125/72   Pulse: Heart Rate:  [90] 90   Respirations: Resp:  [12] 12   SPO2: SpO2:  [100 %] 100 %   O2 Amount (l/min):     O2 Devices     Weight:       Physical Examination: General appearance - alert, well appearing, and in no distress  Mental status - alert, oriented to person, place, and time  Chest - clear to auscultation, no wheezes, rales or rhonchi, symmetric air entry  Heart -  normal rate and regular rhythm  Abdomen - gravid, soft, nontender  Neurological - alert, oriented, normal speech, no focal findings or movement disorder noted  Extremities - bilateral lower ext are without cords or tenderness; trace edema noted  Skin - normal coloration and turgor    Presentation:    Cervix: Exam by: Method: sterile vaginal exam performed   Dilation: Cervical Dilation (cm): 1-2   Effacement: Cervical Effacement: 50   Station:         Fetal Heart Rate Assessment   Method: Fetal HR Assessment Method: external   Beats/min: Fetal HR (beats/min): 125   Baseline: Fetal HR Baseline: normal range   Variability: Fetal HR Variability: moderate (amplitude range 6 to 25 bpm)   Accels: Fetal HR Accelerations: greater than/equal to 15 bpm, lasting at least 15 seconds   Decels: Fetal HR Decelerations: absent   Tracing Category:       Uterine Assessment   Method: Method: external tocotransducer, palpation   Frequency (min): Contraction Frequency (Minutes): 2-5   Ctx Count in 10 min:     Duration:     Intensity: Contraction Intensity: mild by palpation   Intensity by IUPC:     Resting Tone: Uterine Resting Tone: soft by palpation   Resting Tone by IUPC:     Deena Units:             Assessment & Plan       Pregnancy     premature rupture of membranes (PPROM) with onset of labor within 24 hours of rupture in third trimester, antepartum        Assessment:  1.  Intrauterine pregnancy at 35w4d gestation with reactive fetal status.    2.  PPROM at 35 wks: options reviewed and pt agrees with pitocin augmentation of labor.   3.  Obstetrical history significant for is non-contributory.  4.  GBS status:   External Strep Group B Ag   Date Value Ref Range Status   2025 Unknown  Final       Plan:  1. PPROM: pitocin augmentation and PCN for GBS unknown   2. Plan of care has been reviewed with patient   3.  Risks, benefits of treatment plan have been discussed.  4.  All questions have been answered.        Ros  Stephan Conklin MD  6/4/2025  15:13 EDT

## 2025-06-04 NOTE — TELEPHONE ENCOUNTER
Pt called back again & stated she changed her mind & is now going to [facility]. L&D has been notified.

## 2025-06-05 LAB
ATMOSPHERIC PRESS: 749.8 MMHG
BASE EXCESS BLDCOA CALC-SCNC: -1.9 MMOL/L (ref -2–2)
BDY SITE: NORMAL
DEVICE COMMENT: NORMAL
HCO3 BLDCOA-SCNC: 25.1 MMOL/L (ref 22–28)
MODALITY: NORMAL
PCO2 BLDCOA: 51.5 MMHG (ref 43–63)
PH BLDCOA: 7.3 PH UNITS (ref 7.18–7.34)
PO2 BLDCOA: 24.6 MMHG (ref 12–26)
SAO2 % BLDCOA: 37.1 %

## 2025-06-05 PROCEDURE — C1755 CATHETER, INTRASPINAL: HCPCS

## 2025-06-05 PROCEDURE — 25010000002 ONDANSETRON PER 1 MG: Performed by: STUDENT IN AN ORGANIZED HEALTH CARE EDUCATION/TRAINING PROGRAM

## 2025-06-05 PROCEDURE — 82803 BLOOD GASES ANY COMBINATION: CPT | Performed by: OBSTETRICS & GYNECOLOGY

## 2025-06-05 PROCEDURE — 25010000002 METHYLERGONOVINE MALEATE PER 0.2 MG: Performed by: OBSTETRICS & GYNECOLOGY

## 2025-06-05 PROCEDURE — 59400 OBSTETRICAL CARE: CPT | Performed by: OBSTETRICS & GYNECOLOGY

## 2025-06-05 PROCEDURE — 25010000002 PENICILLIN G POTASSIUM PER 600000 UNITS: Performed by: OBSTETRICS & GYNECOLOGY

## 2025-06-05 PROCEDURE — 25810000003 LACTATED RINGERS PER 1000 ML: Performed by: OBSTETRICS & GYNECOLOGY

## 2025-06-05 PROCEDURE — 88307 TISSUE EXAM BY PATHOLOGIST: CPT

## 2025-06-05 PROCEDURE — 0UQMXZZ REPAIR VULVA, EXTERNAL APPROACH: ICD-10-PCS | Performed by: OBSTETRICS & GYNECOLOGY

## 2025-06-05 RX ORDER — ACETAMINOPHEN 325 MG/1
650 TABLET ORAL EVERY 6 HOURS PRN
Status: DISCONTINUED | OUTPATIENT
Start: 2025-06-05 | End: 2025-06-07 | Stop reason: HOSPADM

## 2025-06-05 RX ORDER — MISOPROSTOL 200 UG/1
600 TABLET ORAL ONCE AS NEEDED
Status: DISCONTINUED | OUTPATIENT
Start: 2025-06-05 | End: 2025-06-07 | Stop reason: HOSPADM

## 2025-06-05 RX ORDER — BISACODYL 10 MG
10 SUPPOSITORY, RECTAL RECTAL DAILY PRN
Status: DISCONTINUED | OUTPATIENT
Start: 2025-06-06 | End: 2025-06-07 | Stop reason: HOSPADM

## 2025-06-05 RX ORDER — DOCUSATE SODIUM 100 MG/1
100 CAPSULE, LIQUID FILLED ORAL 2 TIMES DAILY
Status: DISCONTINUED | OUTPATIENT
Start: 2025-06-05 | End: 2025-06-07 | Stop reason: HOSPADM

## 2025-06-05 RX ORDER — PRENATAL VIT/IRON FUM/FOLIC AC 27MG-0.8MG
1 TABLET ORAL DAILY
Status: DISCONTINUED | OUTPATIENT
Start: 2025-06-05 | End: 2025-06-07 | Stop reason: HOSPADM

## 2025-06-05 RX ORDER — CARBOPROST TROMETHAMINE 250 UG/ML
250 INJECTION, SOLUTION INTRAMUSCULAR
Status: DISCONTINUED | OUTPATIENT
Start: 2025-06-05 | End: 2025-06-05 | Stop reason: HOSPADM

## 2025-06-05 RX ORDER — TRANEXAMIC ACID 10 MG/ML
INJECTION, SOLUTION INTRAVENOUS
Status: COMPLETED
Start: 2025-06-05 | End: 2025-06-05

## 2025-06-05 RX ORDER — PHYTONADIONE 1 MG/.5ML
INJECTION, EMULSION INTRAMUSCULAR; INTRAVENOUS; SUBCUTANEOUS
Status: ACTIVE
Start: 2025-06-05 | End: 2025-06-05

## 2025-06-05 RX ORDER — TRANEXAMIC ACID 10 MG/ML
1000 INJECTION, SOLUTION INTRAVENOUS ONCE AS NEEDED
Status: DISCONTINUED | OUTPATIENT
Start: 2025-06-05 | End: 2025-06-07 | Stop reason: HOSPADM

## 2025-06-05 RX ORDER — HYDROCODONE BITARTRATE AND ACETAMINOPHEN 10; 325 MG/1; MG/1
1 TABLET ORAL EVERY 4 HOURS PRN
Status: DISCONTINUED | OUTPATIENT
Start: 2025-06-05 | End: 2025-06-07 | Stop reason: HOSPADM

## 2025-06-05 RX ORDER — METHYLERGONOVINE MALEATE 0.2 MG/ML
200 INJECTION INTRAVENOUS ONCE AS NEEDED
Status: COMPLETED | OUTPATIENT
Start: 2025-06-05 | End: 2025-06-05

## 2025-06-05 RX ORDER — OXYTOCIN/0.9 % SODIUM CHLORIDE 30/500 ML
999 PLASTIC BAG, INJECTION (ML) INTRAVENOUS ONCE
Status: COMPLETED | OUTPATIENT
Start: 2025-06-05 | End: 2025-06-05

## 2025-06-05 RX ORDER — IBUPROFEN 600 MG/1
600 TABLET, FILM COATED ORAL EVERY 6 HOURS PRN
Status: DISCONTINUED | OUTPATIENT
Start: 2025-06-05 | End: 2025-06-07 | Stop reason: HOSPADM

## 2025-06-05 RX ORDER — SODIUM CHLORIDE 0.9 % (FLUSH) 0.9 %
1-10 SYRINGE (ML) INJECTION AS NEEDED
Status: DISCONTINUED | OUTPATIENT
Start: 2025-06-05 | End: 2025-06-07 | Stop reason: HOSPADM

## 2025-06-05 RX ORDER — HYDROCODONE BITARTRATE AND ACETAMINOPHEN 5; 325 MG/1; MG/1
1 TABLET ORAL EVERY 4 HOURS PRN
Status: DISCONTINUED | OUTPATIENT
Start: 2025-06-05 | End: 2025-06-07 | Stop reason: HOSPADM

## 2025-06-05 RX ORDER — OXYTOCIN/0.9 % SODIUM CHLORIDE 30/500 ML
125 PLASTIC BAG, INJECTION (ML) INTRAVENOUS ONCE AS NEEDED
Status: COMPLETED | OUTPATIENT
Start: 2025-06-05 | End: 2025-06-05

## 2025-06-05 RX ORDER — ERYTHROMYCIN 5 MG/G
OINTMENT OPHTHALMIC
Status: ACTIVE
Start: 2025-06-05 | End: 2025-06-05

## 2025-06-05 RX ORDER — OXYTOCIN/0.9 % SODIUM CHLORIDE 30/500 ML
250 PLASTIC BAG, INJECTION (ML) INTRAVENOUS CONTINUOUS
Status: DISPENSED | OUTPATIENT
Start: 2025-06-05 | End: 2025-06-05

## 2025-06-05 RX ORDER — HYDROCORTISONE 25 MG/G
1 CREAM TOPICAL AS NEEDED
Status: DISCONTINUED | OUTPATIENT
Start: 2025-06-05 | End: 2025-06-07 | Stop reason: HOSPADM

## 2025-06-05 RX ORDER — METHYLERGONOVINE MALEATE 0.2 MG/ML
200 INJECTION INTRAVENOUS ONCE AS NEEDED
Status: DISCONTINUED | OUTPATIENT
Start: 2025-06-05 | End: 2025-06-07 | Stop reason: HOSPADM

## 2025-06-05 RX ORDER — MISOPROSTOL 200 UG/1
800 TABLET ORAL ONCE AS NEEDED
Status: DISCONTINUED | OUTPATIENT
Start: 2025-06-05 | End: 2025-06-05 | Stop reason: HOSPADM

## 2025-06-05 RX ADMIN — SODIUM CHLORIDE, POTASSIUM CHLORIDE, SODIUM LACTATE AND CALCIUM CHLORIDE 300 ML/HR: 600; 310; 30; 20 INJECTION, SOLUTION INTRAVENOUS at 04:47

## 2025-06-05 RX ADMIN — DOCUSATE SODIUM 100 MG: 100 CAPSULE, LIQUID FILLED ORAL at 23:04

## 2025-06-05 RX ADMIN — DOCUSATE SODIUM 100 MG: 100 CAPSULE, LIQUID FILLED ORAL at 09:15

## 2025-06-05 RX ADMIN — IBUPROFEN 600 MG: 600 TABLET ORAL at 23:04

## 2025-06-05 RX ADMIN — Medication 999 ML/HR: at 06:19

## 2025-06-05 RX ADMIN — PENICILLIN G 3 MILLION UNITS: 3000000 INJECTION, SOLUTION INTRAVENOUS at 04:27

## 2025-06-05 RX ADMIN — ONDANSETRON 4 MG: 2 INJECTION, SOLUTION INTRAMUSCULAR; INTRAVENOUS at 07:19

## 2025-06-05 RX ADMIN — METHYLERGONOVINE MALEATE 200 MCG: 0.2 INJECTION, SOLUTION INTRAMUSCULAR; INTRAVENOUS at 06:23

## 2025-06-05 RX ADMIN — HYDROCODONE BITARTRATE AND ACETAMINOPHEN 1 TABLET: 10; 325 TABLET ORAL at 09:15

## 2025-06-05 RX ADMIN — IBUPROFEN 600 MG: 600 TABLET ORAL at 09:15

## 2025-06-05 RX ADMIN — Medication 125 ML/HR: at 07:48

## 2025-06-05 RX ADMIN — PENICILLIN G 3 MILLION UNITS: 3000000 INJECTION, SOLUTION INTRAVENOUS at 00:35

## 2025-06-05 RX ADMIN — Medication: at 17:11

## 2025-06-05 RX ADMIN — IBUPROFEN 600 MG: 600 TABLET ORAL at 17:11

## 2025-06-05 RX ADMIN — PRENATAL VITAMINS-IRON FUMARATE 27 MG IRON-FOLIC ACID 0.8 MG TABLET 1 TABLET: at 09:15

## 2025-06-05 RX ADMIN — MINERAL OIL 30 ML: 999 LIQUID ORAL at 06:13

## 2025-06-05 RX ADMIN — TRANEXAMIC ACID 1000 MG: 10 INJECTION, SOLUTION INTRAVENOUS at 06:23

## 2025-06-05 NOTE — LACTATION NOTE
This note was copied from a baby's chart.  P1 35/5 weeks.  Baby is in NICU. Educated on colostrum first few days, importance of breast stimulation every 3 hours to establish milk supply,  to expect milk supply  day 3-5 but may be delayed due to  delivery.  Recommended to begin pumping with HGP every 3 hours for 15 min.  Educated on pump use, cleaning, sterilization, milk collection/storage, and labeling.  Label verified with Mom.  Pumped with 24 mm flanges and was comfortable.  Has a Mom[employer]zy hands free pump at home.  Plans to buy a Medela pump to use initially.  Encouraged to call for assist as needed today.  LC number on whiteboard.

## 2025-06-05 NOTE — ANESTHESIA PREPROCEDURE EVALUATION
Anesthesia Evaluation     Patient summary reviewed and Nursing notes reviewed   NPO Solid Status: > 8 hours  NPO Liquid Status: > 4 hours           Airway   Mallampati: II  Neck ROM: full  No difficulty expected  Dental - normal exam     Pulmonary     breath sounds clear to auscultation  Cardiovascular     Rhythm: regular        Neuro/Psych  (+) headaches, psychiatric history Anxiety  GI/Hepatic/Renal/Endo    (+) obesity, morbid obesity    ROS Comment: BMI 40    Musculoskeletal     Abdominal   (+) obese   Substance History      OB/GYN    (+) Pregnant        Other                    Anesthesia Plan    ASA 3     epidural     (  35w4d  )    Anesthetic plan, risks, benefits, and alternatives have been provided, discussed and informed consent has been obtained with: patient.    CODE STATUS:    Code Status (Patient has no pulse and is not breathing): CPR (Attempt to Resuscitate)  Medical Interventions (Patient has pulse or is breathing): Full Support  Level Of Support Discussed With: Patient

## 2025-06-05 NOTE — PLAN OF CARE
Problem: Adult Inpatient Plan of Care  Goal: Plan of Care Review  Outcome: Progressing  Flowsheets (Taken 2025 0740)  Progress: improving  Outcome Evaluation: Pt. admitted to L&D for SROM. Epidural placed for pain control, pitocin titrated per protocol. Pt. delivered baby boy vaginally at 0615. Methergine and TXA administered for increased bleeding following delivery. Baby transferred to transitional NSY. Vital signs and bleeding stable. Mom to be transferred to MBU following recovery.  Plan of Care Reviewed With: patient  Goal: Patient-Specific Goal (Individualized)  Outcome: Progressing  Flowsheets (Taken 2025 0740)  Patient/Family-Specific Goals (Include Timeframe): Healthy mom and baby by discharge  Individualized Care Needs: Explain care prior to performing interventions. Keep up to date regarding nsy transition  Anxieties, Fears or Concerns:  delivery. NICU admission  Goal: Absence of Hospital-Acquired Illness or Injury  Outcome: Progressing  Intervention: Identify and Manage Fall Risk  Recent Flowsheet Documentation  Taken 2025 by Laverne Branham RN  Safety Promotion/Fall Prevention: safety round/check completed  Taken 2025 by Laverne Branham RN  Safety Promotion/Fall Prevention: safety round/check completed  Goal: Optimal Comfort and Wellbeing  Outcome: Progressing  Goal: Readiness for Transition of Care  Outcome: Progressing     Problem: Pain Acute  Goal: Optimal Pain Control and Function  Outcome: Progressing     Problem:  Fall Injury Risk  Goal: Absence of Fall, Infant Drop and Related Injury  Outcome: Progressing  Intervention: Promote Injury-Free Environment  Recent Flowsheet Documentation  Taken 2025 by Laverne Branham RN  Safety Promotion/Fall Prevention: safety round/check completed  Taken 2025 by Laverne Branham RN  Safety Promotion/Fall Prevention: safety round/check completed     Problem: Postpartum (Vaginal Delivery)  Goal:  Successful Parent Role Transition  Outcome: Progressing  Goal: Hemostasis  Outcome: Progressing  Goal: Absence of Infection Signs and Symptoms  Outcome: Progressing  Goal: Anesthesia/Sedation Recovery  Outcome: Progressing  Intervention: Optimize Anesthesia Recovery  Recent Flowsheet Documentation  Taken 6/5/2025 0430 by Laverne Branham RN  Safety Promotion/Fall Prevention: safety round/check completed  Taken 6/5/2025 0030 by Laverne Branham RN  Safety Promotion/Fall Prevention: safety round/check completed  Goal: Optimal Pain Control and Function  Outcome: Progressing  Goal: Effective Urinary Elimination  Outcome: Progressing   Goal Outcome Evaluation:  Plan of Care Reviewed With: patient        Progress: improving  Outcome Evaluation: Pt. admitted to L&D for SROM. Epidural placed for pain control, pitocin titrated per protocol. Pt. delivered baby boy vaginally at 0615. Methergine and TXA administered for increased bleeding following delivery. Baby transferred to transitional NSY. Vital signs and bleeding stable. Mom to be transferred to MBU following recovery.

## 2025-06-05 NOTE — ANESTHESIA PROCEDURE NOTES
Labor Epidural      Patient reassessed immediately prior to procedure    Patient location during procedure: OB  Start Time: 6/4/2025 9:35 PM  Stop Time: 6/4/2025 9:58 PM  Performed By  Anesthesiologist: Claudio Amaya MD  Preanesthetic Checklist  Completed: patient identified, IV checked, site marked, risks and benefits discussed, surgical consent, monitors and equipment checked, pre-op evaluation and timeout performed  Prep:  Pt Position:sitting  Sterile Tech:cap, gloves, mask and sterile barrier  Prep:povidone-iodine 7.5% surgical scrub  Monitoring:blood pressure monitoring, continuous pulse oximetry and EKG  Epidural Block Procedure:  Approach:midline  Guidance:landmark technique and palpation technique  Location:L4-L5  Needle Type:Tuohy  Needle Gauge:17  Loss of Resistance Medium: air  Number of Attempts: 1  Post Assessment:  Dressing:occlusive dressing applied and secured with tape  Pt Tolerance:patient tolerated the procedure well with no apparent complications  Complications:no

## 2025-06-05 NOTE — L&D DELIVERY NOTE
[facility]   Vaginal Delivery Note    Patient Name: Rossy Silva  : 2000  MRN: [id number]    Date of Delivery: 2025    Diagnosis     Pre & Post-Delivery:  Intrauterine pregnancy at 35w5d  Labor status: Spontaneous Onset of Labor    Pregnancy     premature rupture of membranes (PPROM) with onset of labor within 24 hours of rupture in third trimester, antepartum    Vaginal delivery             Problem List    Transfer to Postpartum     Review the Delivery Report for details.     Delivery     Delivery: Vaginal, Spontaneous    YOB: 2024   Time of Birth:  Gestational Age 6:15 AM  35w5d     Anesthesia: Epidural    Delivering clinician: Ros Conklin   Forceps?   No   Vacuum? No    Shoulder dystocia present: No        Delivery narrative:  25 year old G 2 P 0010 admitted for  premature rupture of membranes. Fetal heart tones were reassuring. She was given penicillin for GBS prophylaxis and pitocin for labor augmentation. She progress and had epidural placed for pain control. She pushed approximately 10 minutes to spontaneous vaginal delivery. The infant's shoulders and body delivered easily. The infant was placed on mother's chest until the umbilical cord was clamped and cut after 30 seconds by the father of the baby. The infant was [facility] to the [facility] for evaluation by  staff due to early gestation. He remained stable. The placenta delivered spontaneously and appeared intact. Manual exploration of the uterus did not reveal retained products. A mild uterine atony was noted despite pitocin infusion and fundal massage. Methergine and tranexamic acid were ordered and given. Uterine tone and lochia normalized. A right labial laceration was repaired. A small left labial laceration was hemostatic. The cervix, vagina and perineum appeared intact. All counts were correct following.       Infant     Findings: male infant     Infant observations: Weight: 2740 g (6  lb 0.7 oz)  Length: 20.5 in  Observations/Comments:  Yonis Rm 2     Apgars: 9  @ 1 minute /    9  @ 5 minutes   Infant Name: Gera     Placenta & Cord         Placenta delivered  Spontaneous at   6/5/2025  6:19 AM    Cord: 3 vessels present.   Nuchal Cord?  no   Cord blood obtained: Yes   Cord gases obtained:  Yes   Cord gas results:     Arterial:    pH, Cord Arterial   Date Value Ref Range Status   06/05/2025 7.30 7.18 - 7.34 pH Units Final     Comment:     Serial Number: [id number]:  [id number]     Base Exc, Cord Arterial   Date Value Ref Range Status   06/05/2025 -1.9 -2.0 - 2.0 mmol/L Final        Repair     Episiotomy: None    No    Lacerations: Yes  Laceration Information  Laceration Repaired?   Perineal: None     Periurethral:       Labial: bilateral Yes   Sulcus:       Vaginal:       Cervical:         Suture used for repair: 4-0 monocryl     Estimated Blood Loss:  See QBL     Quantitative Blood Loss:    QBL from VAG DEL: 296 (06/05/25 0724)   TOTAL BLOOD LOSS : 296 mL (6/4/2025 10:22 AM - 6/5/2025  8:56 AM)  Complications     uterine inertia    Disposition     Mother to Mother Baby/Postpartum  in stable condition currently.  Baby to NBN  in stable condition currently.    Ros Conklin MD  06/05/25  08:56 EDT

## 2025-06-06 PROBLEM — E66.9 OBESITY (BMI 30-39.9): Status: ACTIVE | Noted: 2025-06-06

## 2025-06-06 LAB
BASOPHILS # BLD AUTO: 0.03 10*3/MM3 (ref 0–0.2)
BASOPHILS NFR BLD AUTO: 0.3 % (ref 0–1.5)
DEPRECATED RDW RBC AUTO: 40.5 FL (ref 37–54)
EOSINOPHIL # BLD AUTO: 0.12 10*3/MM3 (ref 0–0.4)
EOSINOPHIL NFR BLD AUTO: 1.3 % (ref 0.3–6.2)
ERYTHROCYTE [DISTWIDTH] IN BLOOD BY AUTOMATED COUNT: 13 % (ref 12.3–15.4)
HCT VFR BLD AUTO: 28 % (ref 34–46.6)
HGB BLD-MCNC: 9.3 G/DL (ref 12–15.9)
IMM GRANULOCYTES # BLD AUTO: 0.11 10*3/MM3 (ref 0–0.05)
IMM GRANULOCYTES NFR BLD AUTO: 1.2 % (ref 0–0.5)
LYMPHOCYTES # BLD AUTO: 1.07 10*3/MM3 (ref 0.7–3.1)
LYMPHOCYTES NFR BLD AUTO: 11.5 % (ref 19.6–45.3)
MCH RBC QN AUTO: 28.7 PG (ref 26.6–33)
MCHC RBC AUTO-ENTMCNC: 33.2 G/DL (ref 31.5–35.7)
MCV RBC AUTO: 86.4 FL (ref 79–97)
MONOCYTES # BLD AUTO: 0.68 10*3/MM3 (ref 0.1–0.9)
MONOCYTES NFR BLD AUTO: 7.3 % (ref 5–12)
NEUTROPHILS NFR BLD AUTO: 7.27 10*3/MM3 (ref 1.7–7)
NEUTROPHILS NFR BLD AUTO: 78.4 % (ref 42.7–76)
NRBC BLD AUTO-RTO: 0 /100 WBC (ref 0–0.2)
PLATELET # BLD AUTO: 233 10*3/MM3 (ref 140–450)
PMV BLD AUTO: 9.4 FL (ref 6–12)
RBC # BLD AUTO: 3.24 10*6/MM3 (ref 3.77–5.28)
WBC NRBC COR # BLD AUTO: 9.28 10*3/MM3 (ref 3.4–10.8)

## 2025-06-06 PROCEDURE — 85025 COMPLETE CBC W/AUTO DIFF WBC: CPT | Performed by: OBSTETRICS & GYNECOLOGY

## 2025-06-06 PROCEDURE — 0503F POSTPARTUM CARE VISIT: CPT | Performed by: STUDENT IN AN ORGANIZED HEALTH CARE EDUCATION/TRAINING PROGRAM

## 2025-06-06 RX ORDER — FERROUS SULFATE 325(65) MG
325 TABLET ORAL
Status: DISCONTINUED | OUTPATIENT
Start: 2025-06-06 | End: 2025-06-07 | Stop reason: HOSPADM

## 2025-06-06 RX ADMIN — IBUPROFEN 600 MG: 600 TABLET ORAL at 16:55

## 2025-06-06 RX ADMIN — FERROUS SULFATE TAB 325 MG (65 MG ELEMENTAL FE) 325 MG: 325 (65 FE) TAB at 09:02

## 2025-06-06 RX ADMIN — PRENATAL VITAMINS-IRON FUMARATE 27 MG IRON-FOLIC ACID 0.8 MG TABLET 1 TABLET: at 09:02

## 2025-06-06 RX ADMIN — IBUPROFEN 600 MG: 600 TABLET ORAL at 09:01

## 2025-06-06 RX ADMIN — DOCUSATE SODIUM 100 MG: 100 CAPSULE, LIQUID FILLED ORAL at 09:02

## 2025-06-06 RX ADMIN — HYDROCODONE BITARTRATE AND ACETAMINOPHEN 1 TABLET: 5; 325 TABLET ORAL at 22:43

## 2025-06-06 RX ADMIN — DOCUSATE SODIUM 100 MG: 100 CAPSULE, LIQUID FILLED ORAL at 22:44

## 2025-06-06 NOTE — PROGRESS NOTES
[facility]  Obstetrics and Gynecology     2025    Name:Rossy Silva   MR#:[id number]    Vaginal Delivery Progress Note    HD#2    Subjective   Postpartum Day 1: 25 y.o. female  delivered a male infant.     The patient feels well.  Her pain is controlled.    She is ambulating well.  Patient describes her bleeding as thin lochia.  Infant doing better in NICU.    Breastfeeding/bottle:  The patient is currently breastfeeding.    Patient Active Problem List   Diagnosis    Supervision of low-risk pregnancy, third trimester    Low-lying placenta in second trimester    Iron deficiency anemia during pregnancy    Uterine size date discrepancy pregnancy    Pregnancy     premature rupture of membranes (PPROM) with onset of labor within 24 hours of rupture in third trimester, antepartum    Vaginal delivery       Objective   Vital Signs Range for the last 24 hours  Temp: Temp:  [97.8 °F (36.6 °C)-98.1 °F (36.7 °C)] 98.1 °F (36.7 °C) Temp src: Oral   BP: BP: (116-123)/(67-78) 117/72        Pulse: Heart Rate:  [76-83] 76  RR: Resp:  [16-18] 16  Weight: 105 kg (232 lb 3.2 oz)  BMI:  Body mass index is 39.86 kg/m².    Results from last 7 days   Lab Units 25  0755 25  1248   WBC 10*3/mm3 9.28 10.22   HEMOGLOBIN g/dL 9.3* 10.9*   HEMATOCRIT % 28.0* 33.9*   PLATELETS 10*3/mm3 233 273     Results from last 7 days   Lab Units 25  1248   SODIUM mmol/L 136   POTASSIUM mmol/L 3.2*   CHLORIDE mmol/L 103   CO2 mmol/L 20.6*   BUN mg/dL 3.0*   CREATININE mg/dL 0.53*   CALCIUM mg/dL 9.0   ALK PHOS U/L 123*   ALT (SGPT) U/L 8   AST (SGOT) U/L 14   GLUCOSE mg/dL 77       Physical Exam  Vitals and nursing note reviewed.   Constitutional:       General: She is not in acute distress.     Appearance: Normal appearance.   Cardiovascular:      Pulses: Normal pulses.   Pulmonary:      Effort: Pulmonary effort is normal.   Abdominal:      General: There is no distension.      Palpations: Abdomen is soft.       Tenderness: There is no abdominal tenderness. There is no guarding or rebound.   Genitourinary:     Comments: Uterus firm and below umbilicus  Musculoskeletal:         General: No swelling or tenderness.      Right lower leg: No edema.      Left lower leg: No edema.   Neurological:      Mental Status: She is alert and oriented to person, place, and time.   Psychiatric:         Mood and Affect: Mood normal.         Behavior: Behavior normal.       Assessment/Plan   1.  PPD# 1    Pregnancy     premature rupture of membranes (PPROM) with onset of labor within 24 hours of rupture in third trimester, antepartum    Vaginal delivery    Obesity (BMI 30-39.9)      Plan:   Continue routine postpartum care    Ro Multani MD  2025 12:23 EDT

## 2025-06-06 NOTE — PROGRESS NOTES
"Discharge Planning Assessment  [facility]     Patient Name: Rossy Silva  MRN: [id number]  Today's Date: 6/6/2025    Admit Date: 6/4/2025    Plan: Infant may discharge to mother when medically ready. JAILENE Thornton.   Discharge Needs Assessment    No documentation.                  Discharge Plan       Row Name 06/06/25 1424       Plan    Plan Infant may discharge to mother when medically ready. JAILENE Thornton.    Plan Comments Mother: Rossy Silva \"Criss\", MRN: [id number]; infant: Lisa \"Gera\" Ricardo, MRN: [id number]. CSW consulted for \"NICU admission.\" Of note, no toxicology screens were ordered for mother or infant as need was not warranted at this time. CSW met with mother and MGM in infant's NICU room. Mother gave consent for MGM to be present during assessment. Mother verified address, phone number, and insurance. Mother asked how to apply infant to Medicaid. CSW explained the role of [employer], and mother agreed to a consult today. CSW called and left a voicemail with [employer]. Mother reports having a car seat, crib/bassinet, clothes, and diapers for infant. This is mother and father's first baby. Mother reports, maternal grandma, paternal grandparents, maternal sister, father of infant/, and other family members are available for support as needed. Mother reports infant is following up with [facility] after discharge; mother is comfortable scheduling appointments for infant and has reliable transportation. Mother is not current with [facility] but plans to call after discharge to see if she qualifies. CSW explained her role as NICU  and encouraged mother to reach out if needed. CSW provided mother with a packet of resources including: WIC, HANDS, transportation, infant supplies, counseling, online support groups, postpartum mood and anxiety resources, NICU parent resources, and general community resources. CSW spent time building rapport with mother, and " offered validation, support, and encouragement to mother throughout assessment. Mother was polite and appropriate, and denied having unmet needs or concerns at this time. CSW will remain available for psychosocial needs while infant is in the NICU. JAILENE Thornton.                       Demographic Summary       Row Name 06/06/25 1423       General Information    Admission Type inpatient    Arrived From home    Referral Source nursing    Reason for Consult other (see comments)    General Information Comments NICU admission                   Functional Status       Row Name 06/06/25 1423       Functional Status, IADL    Medications independent    Meal Preparation independent    Housekeeping independent    Laundry independent    Shopping independent       Mental Status    General Appearance WDL WDL       Mental Status Summary    Recent Changes in Mental Status/Cognitive Functioning no changes       Employment/    Employment Status employed full-time    Employment/ Comments [employer]                   Psychosocial       Row Name 06/06/25 1424       Behavior WDL    Behavior WDL WDL       Emotion Mood WDL    Emotion/Mood/Affect WDL WDL       Speech WDL    Speech WDL WDL       Perceptual State WDL    Perceptual State WDL WDL       Thought Process WDL    Thought Process WDL WDL       Intellectual Performance WDL    Intellectual Performance WDL WDL       Coping/Stress    Major Change/Loss/Stressor birth    Patient Personal Strengths future/goal oriented;motivated;positive attitude;strong support system    Sources of Support other family members;parent(s);sibling(s);spouse                   Abuse/Neglect       Row Name 06/06/25 1424       Personal Safety    Physical Signs of Abuse Present no                   Legal    No documentation.                  Substance Abuse    No documentation.                  Patient Forms    No documentation.                     ARLEY Hughes

## 2025-06-06 NOTE — PLAN OF CARE
Goal Outcome Evaluation:  Plan of Care Reviewed With: patient        Progress: improving  Outcome Evaluation: VSS, pain controlled with PO meds. Utilizing breast pump while infant in NICU. Ambulatig well.

## 2025-06-06 NOTE — LACTATION NOTE
This note was copied from a baby's chart.  Reports has been pumping but is concerned that is not getting any milk yet.  Reassured that this is common and to continue pumping at least 8 times per day for 15 min.  Educated that milk supply is usually expected by day 3-5 but may be delayed due to  delivery.  Encouraged to call for any concerns.  LC number on WB.

## 2025-06-07 VITALS
DIASTOLIC BLOOD PRESSURE: 61 MMHG | WEIGHT: 232.2 LBS | OXYGEN SATURATION: 100 % | HEART RATE: 81 BPM | TEMPERATURE: 98 F | RESPIRATION RATE: 18 BRPM | SYSTOLIC BLOOD PRESSURE: 112 MMHG | BODY MASS INDEX: 39.86 KG/M2

## 2025-06-07 RX ORDER — IBUPROFEN 600 MG/1
600 TABLET, FILM COATED ORAL EVERY 8 HOURS PRN
Qty: 30 TABLET | Refills: 0 | Status: SHIPPED | OUTPATIENT
Start: 2025-06-07

## 2025-06-07 RX ORDER — ACETAMINOPHEN 325 MG/1
650 TABLET ORAL EVERY 8 HOURS PRN
Qty: 30 TABLET | Refills: 0 | Status: SHIPPED | OUTPATIENT
Start: 2025-06-07

## 2025-06-07 RX ADMIN — DOCUSATE SODIUM 100 MG: 100 CAPSULE, LIQUID FILLED ORAL at 08:31

## 2025-06-07 RX ADMIN — PRENATAL VITAMINS-IRON FUMARATE 27 MG IRON-FOLIC ACID 0.8 MG TABLET 1 TABLET: at 08:31

## 2025-06-07 RX ADMIN — IBUPROFEN 600 MG: 600 TABLET ORAL at 08:38

## 2025-06-07 RX ADMIN — FERROUS SULFATE TAB 325 MG (65 MG ELEMENTAL FE) 325 MG: 325 (65 FE) TAB at 08:31

## 2025-06-07 NOTE — DISCHARGE SUMMARY
[facility]  Delivery Discharge Summary    Primary OB Clinician:     EDC: Estimated Date of Delivery: 25    Gestational Age:35w5d    Antepartum complications/problem list:   Patient Active Problem List   Diagnosis    Supervision of low-risk pregnancy, third trimester    Low-lying placenta in second trimester    Iron deficiency anemia during pregnancy    Uterine size date discrepancy pregnancy    Pregnancy     premature rupture of membranes (PPROM) with onset of labor within 24 hours of rupture in third trimester, antepartum    Vaginal delivery    Obesity (BMI 30-39.9)        Date of Delivery: 2025  Time of Delivery: 6:15 AM    Delivered By:  Ros Conklin    Delivery Type: Vaginal, Spontaneous     Tubal Ligation: n/a    Baby:male infant;   Apgar:  9  @ 1 minute /   Apgar:  9  @ 5 minutes   Weight: 2740 g (6 lb 0.7 oz)   Length: 20.5    Anesthesia: Epidural     Intrapartum complications: PROM, mild atony    Laceration: Yes  Laceration Information  Laceration Repaired?   Perineal: None     Periurethral:       Labial: bilateral Yes   Sulcus:       Vaginal:       Cervical:               Placenta: Spontaneous    Feeding method: Breastfeeding Status: Yes    Rh Immune globulin given: not applicable, O+    Rubella vaccine given: not applicable, Immune    Discharge Date: 2025; Discharge Time: 09:53 EDT        Plan:      Follow-up appointment with Dr. Sarmiento in 3 weeks.

## 2025-06-07 NOTE — PROGRESS NOTES
[facility]  Vaginal Delivery Progress Note    Subjective   Postpartum Day 2: Vaginal Delivery    The patient feels well.  Her pain is adequately controlled.  She is ambulating well.  Patient describes her bleeding as staining only.    Breastfeeding: pumping, infant is in NICU    ROS:  Neuro: neg for severe headache/vision changes  Pulm: neg for soa  CV: neg for chest pain  : neg for heavy bleeding  Musculoskeletal: neg for leg pain    Objective     Vital Signs Range for the last 24 hours  Temperature: Temp:  [98 °F (36.7 °C)-98.4 °F (36.9 °C)] 98 °F (36.7 °C)   Temp Source: Temp src: Oral   BP: BP: (112-139)/(61-93) 112/61   Pulse: Heart Rate:  [69-83] 81   Respirations: Resp:  [16-18] 18   SPO2:     O2 Amount (l/min):     O2 Devices Device (Oxygen Therapy): room air   Weight:       Admit Height:         Physical Exam:  General:  no acute distress.  Abdomen: Fundus: appropriate, firm, non tender  Extremities: Bilateral lower ext with trace edema, no cords or tenderness.       Lab results reviewed:    Lab Results   Component Value Date    WBC 9.28 2025    HGB 9.3 (L) 2025    HCT 28.0 (L) 2025    MCV 86.4 2025     2025     Rubella:  Transcribed from prenatal record --    Rubella Antibodies, IgG   Date Value Ref Range Status   2024 1.39 Immune >0.99 index Final     Comment:                                     Non-immune       <0.90                                  Equivocal  0.90 - 0.99                                  Immune           >0.99     Rh Status:    RH type   Date Value Ref Range Status   2025 Positive  Final     Immunizations:   Immunization History   Administered Date(s) Administered    Tdap 2025       Assessment & Plan       Pregnancy     premature rupture of membranes (PPROM) with onset of labor within 24 hours of rupture in third trimester, antepartum    Vaginal delivery    Obesity (BMI 30-39.9)      Rossy Silva is Day 2  post-partum   Vaginal, Spontaneous: pt is doing well and desires discharge today. Instructions reviewed.    Plan:  Discharge home with standard precautions and return to clinic in 3 weeks or prn.      Ros Conklin MD  6/7/2025  09:44 EDT

## 2025-06-08 ENCOUNTER — MATERNAL SCREENING (OUTPATIENT)
Dept: CALL CENTER | Facility: HOSPITAL | Age: 25
End: 2025-06-08
Payer: COMMERCIAL

## 2025-06-08 NOTE — OUTREACH NOTE
Maternal Screening Survey      Flowsheet Row Responses   Eligibility Eligible   Prep survey completed? Yes   Facility patient discharged from? [facility] - Registered Nurse

## 2025-06-09 ENCOUNTER — LACTATION ENCOUNTER (OUTPATIENT)
Dept: NURSERY | Facility: HOSPITAL | Age: 25
End: 2025-06-09

## 2025-06-10 ENCOUNTER — LACTATION ENCOUNTER (OUTPATIENT)
Dept: NURSERY | Facility: HOSPITAL | Age: 25
End: 2025-06-10

## 2025-06-10 NOTE — LACTATION NOTE
Called patient and left VM, moved his appt back tomorrow AM for c-scope.     Asked him to arrive between 7am-7:15am at [facility] for procedure.   This note was copied from a baby's chart.  RN called for assist latching baby and baby is awake and showing hunger cues.  Used BF pillow and placed a roll under Mom's breast for support and to lift breast.  Placed baby in cross cradle hold to left breast and baby latched well without NS with deep jaw excursion and audible swallows heard.

## 2025-06-15 ENCOUNTER — MATERNAL SCREENING (OUTPATIENT)
Dept: CALL CENTER | Facility: HOSPITAL | Age: 25
End: 2025-06-15
Payer: COMMERCIAL

## 2025-06-15 NOTE — OUTREACH NOTE
Maternal Screening Survey      Flowsheet Row Responses   [facility]   [facility] successful? Yes   Call start time    Call end time    I have been able to laugh and see the funny side of things. 0   I have looked forward with enjoyment to things. 0   I have blamed myself unnecessarily when things went wrong. 1   I have been anxious or worried for no good reason. 1   I have felt scared or panicky for no good reason. 0   Things have been getting on top of me. 0   I have been so unhappy that I have had difficulty sleeping. 0   I have felt sad or miserable. 1   I have been so unhappy that I have been crying. 1   The thought of harming myself has occurred to me. 0   [place]  Depression Scale Total 4   Did any of your parents have problems with alcohol or drug use? No   Do any of your peers have problems with alcohol or drug use? No   Does your partner have problems with alcohol or drug use? No   Before you were pregnant did you have problems with alcohol or drug use? (past) No   In the past month, did you drink beer, wine, liquor or use any other drugs? (pregnancy) No   Maternal Screening call completed Yes   Call end time               Rhonda NOGUERA - Registered Nurse

## 2025-06-15 NOTE — OUTREACH NOTE
Maternal Screening Survey      Flowsheet Row Responses   Facility patient discharged from? [place]   Attempt successful? No   Unsuccessful attempts Attempt 1              Rhonda NOGUERA - Registered Nurse

## 2025-06-26 ENCOUNTER — POSTPARTUM VISIT (OUTPATIENT)
Dept: OBSTETRICS AND GYNECOLOGY | Age: 25
End: 2025-06-26
Payer: COMMERCIAL

## 2025-06-26 VITALS
BODY MASS INDEX: 35.96 KG/M2 | WEIGHT: 210.6 LBS | SYSTOLIC BLOOD PRESSURE: 120 MMHG | HEIGHT: 64 IN | DIASTOLIC BLOOD PRESSURE: 92 MMHG

## 2025-06-26 DIAGNOSIS — M54.50 ACUTE MIDLINE LOW BACK PAIN, UNSPECIFIED WHETHER SCIATICA PRESENT: Primary | ICD-10-CM

## 2025-06-26 PROCEDURE — 0503F POSTPARTUM CARE VISIT: CPT | Performed by: STUDENT IN AN ORGANIZED HEALTH CARE EDUCATION/TRAINING PROGRAM

## 2025-06-26 RX ORDER — CYCLOBENZAPRINE HCL 10 MG
5 TABLET ORAL 2 TIMES DAILY PRN
Qty: 30 TABLET | Refills: 0 | Status: SHIPPED | OUTPATIENT
Start: 2025-06-26 | End: 2025-07-10

## 2025-06-26 RX ORDER — IBUPROFEN 800 MG/1
800 TABLET, FILM COATED ORAL EVERY 8 HOURS PRN
Qty: 30 TABLET | Refills: 0 | Status: SHIPPED | OUTPATIENT
Start: 2025-06-26

## 2025-06-26 RX ORDER — ACETAMINOPHEN 500 MG
1000 TABLET ORAL EVERY 6 HOURS PRN
Qty: 60 TABLET | Refills: 0 | Status: SHIPPED | OUTPATIENT
Start: 2025-06-26

## 2025-06-26 NOTE — PROGRESS NOTES
"[facility]   Obstetrics and Gynecology   Postpartum Visit    2025    Patient: Rossy Silva          MR#:[id number]    History of Present Illness    Chief Complaint   Patient presents with   • Postpartum Care     CC: postpartum delivered 25 via vaginal baby gerard swanson 6lb 0.6oz. breast and bottle feeding.          25 y.o. female  status post vaginal delivery  presents for postpartum visit without complaints.  Mood stable.  Breast-feeding without issue.  Bleeding minimal.  No intercourse since delivery. She does appear to have some accessory breast tissue in the axilla that is leaking milk as well and those areas can be painful.     Contraception: POPs   Vaccines: up to date   Pap: up to date   ________________________________________  Patient Active Problem List   Diagnosis   • Supervision of low-risk pregnancy, third trimester   • Low-lying placenta in second trimester   • Iron deficiency anemia during pregnancy   • Uterine size date discrepancy pregnancy   • Pregnancy   •  premature rupture of membranes (PPROM) with onset of labor within 24 hours of rupture in third trimester, antepartum   • Vaginal delivery   • Obesity (BMI 30-39.9)       Past Medical History:   Diagnosis Date   • Anxiety    • Migraine 3-5 years ago   • Ovarian cyst 1-2 years ago   • Pregnancy 2025       Past Surgical History:   Procedure Laterality Date   • TONSILLECTOMY     • WISDOM TOOTH EXTRACTION         Social History     Tobacco Use   Smoking Status Never   Smokeless Tobacco Never   Tobacco Comments    n/a       has a current medication list which includes the following prescription(s): prenatal (classic) vitamin, acetaminophen, cyclobenzaprine, and ibuprofen.  ________________________________________         Review of Systems   All other systems reviewed and are negative.      Objective     /92   Ht 162 cm (63.78\")   Wt 95.5 kg (210 lb 9.6 oz)   LMP 2024 (Exact Date)   " "Breastfeeding Yes   BMI 36.40 kg/m²    BP Readings from Last 3 Encounters:   25 120/92   25 112/61   25 134/90      Wt Readings from Last 3 Encounters:   25 95.5 kg (210 lb 9.6 oz)   25 105 kg (232 lb 3.2 oz)   25 105 kg (232 lb 3.2 oz)      BMI: Estimated body mass index is 36.4 kg/m² as calculated from the following:    Height as of this encounter: 162 cm (63.78\").    Weight as of this encounter: 95.5 kg (210 lb 9.6 oz).    EXAM     General:     Patient appears well in NAD  Respiratory: Normal effort, no distress  Breast:  declined  Abdomen: Soft, NT, no acute findings  Ext:  No cyanosis or edema    Assessment:    25 y.o. female  status post vaginal delivery  presents for postpartum visit without complaints.   Diagnoses and all orders for this visit:    1. Acute midline low back pain, unspecified whether sciatica present (Primary)  -     cyclobenzaprine (FLEXERIL) 10 MG tablet; Take 0.5 tablets by mouth 2 (Two) Times a Day As Needed for Muscle Spasms for up to 14 days.  Dispense: 30 tablet; Refill: 0  -     ibuprofen (ADVIL,MOTRIN) 800 MG tablet; Take 1 tablet by mouth Every 8 (Eight) Hours As Needed for Mild Pain.  Dispense: 30 tablet; Refill: 0  -     acetaminophen (TYLENOL) 500 MG tablet; Take 2 tablets by mouth Every 6 (Six) Hours As Needed for Mild Pain.  Dispense: 60 tablet; Refill: 0    2. Postpartum follow-up  Comments:  - Doing well from PP standpoint.   - Will consult lactation about axilary breast tissue.  - Return in 3 weeks.   - Slynd sample given.        Plan:  No follow-ups on file.      Supriya Sarmiento MD  2025 08:47 EDT    "

## 2025-07-23 ENCOUNTER — POSTPARTUM VISIT (OUTPATIENT)
Dept: OBSTETRICS AND GYNECOLOGY | Age: 25
End: 2025-07-23
Payer: COMMERCIAL

## 2025-07-23 VITALS
HEIGHT: 64 IN | DIASTOLIC BLOOD PRESSURE: 64 MMHG | SYSTOLIC BLOOD PRESSURE: 118 MMHG | WEIGHT: 208 LBS | BODY MASS INDEX: 35.51 KG/M2

## 2025-07-23 RX ORDER — SERTRALINE HYDROCHLORIDE 25 MG/1
25 TABLET, FILM COATED ORAL DAILY
Qty: 30 TABLET | Refills: 0 | Status: SHIPPED | OUTPATIENT
Start: 2025-07-23

## 2025-07-23 RX ORDER — DROSPIRENONE 4 MG/1
1 TABLET, FILM COATED ORAL DAILY
Qty: 28 TABLET | Refills: 0 | COMMUNITY
Start: 2025-07-23 | End: 2025-07-23

## 2025-07-23 RX ORDER — ACETAMINOPHEN AND CODEINE PHOSPHATE 120; 12 MG/5ML; MG/5ML
1 SOLUTION ORAL DAILY
Qty: 28 TABLET | Refills: 12 | Status: SHIPPED | OUTPATIENT
Start: 2025-07-23 | End: 2026-07-23

## 2025-07-23 RX ORDER — DROSPIRENONE 4 MG/1
TABLET, FILM COATED ORAL
COMMUNITY
End: 2025-07-23 | Stop reason: SDUPTHER

## 2025-07-23 RX ORDER — DROSPIRENONE 4 MG/1
1 TABLET, FILM COATED ORAL DAILY
Qty: 28 TABLET | Refills: 3 | Status: SHIPPED | OUTPATIENT
Start: 2025-07-23 | End: 2025-07-23

## 2025-07-23 NOTE — PROGRESS NOTES
Patient presents for a postpartum visit. She is 6 weeks postpartum following a spontaneous vaginal delivery. I have fully reviewed the prenatal and intrapartum course. The delivery was at 35 gestational weeks. Outcome: spontaneous vaginal delivery. Anesthesia: epidural. Postpartum course has been good. Baby's course has been good. Baby is feeding by breast. Bleeding no bleeding and started menses. Bowel function is normal. Bladder function is normal. Patient is not sexually active. Contraception method is oral progesterone-only contraceptive. Postpartum depression screening: positive.      Postpartum inventory   Infant feeding: (Patient-Rptd) Combination  Postpartum pain: (Patient-Rptd) Mild (pain 1-3)  Vaginal bleeding : (Patient-Rptd) Mostly light - only using liner  Depression/Anxiety: (Patient-Rptd) Minimal anxiety  Contracepton: (Patient-Rptd) Birth control pills  COMMENTS: (Patient-Rptd) I am having extreme lower back pain      Postpartum Depression: Low Risk  (2025)    [place]  Depression Scale     Last EPDS Total Score: 4     Last EPDS Self Harm Result: Unrecognized value   Recent Concern: Postpartum Depression - Medium Risk (2025)    [place]  Depression Scale     Last EPDS Total Score: 6     Last EPDS Self Harm Result: Never      The following portions of the patient's history were reviewed and updated as appropriate: allergies, current medications, past family history, past medical history, past social history, past surgical history, and problem list.    Review of Systems  Genitourinary:negative, pp exam        Vitals:    25 1315   BP: 118/64       General:  alert, appears stated age, and cooperative    Breasts:  na   Lungs: na   Heart:  na   Abdomen: na    Vulva:  normal   Vagina: normal vagina, no discharge, exudate, lesion, or erythema   Cervix:  anteverted and no lesions   Corpus: normal size, contour, position, consistency, mobility, non-tender   Adnexa:   normal adnexa and no mass, fullness, tenderness   Rectal Exam: Not performed.         6 wk postpartum exam. Pap smear not done at today's visit.    1. Contraception: oral progesterone-only contraceptive  2. C/w POP for BC. Can resume SA  3. Follow up in: 1 year or as needed.    Postpartum depression discussed.  She would like to start an antidepressant.  I will start her on Zoloft 25 mg and can increase from there.  Also recommend counseling and following up with therapist

## 2025-08-29 ENCOUNTER — OFFICE VISIT (OUTPATIENT)
Dept: OBSTETRICS AND GYNECOLOGY | Age: 25
End: 2025-08-29
Payer: COMMERCIAL

## 2025-08-29 VITALS
WEIGHT: 205 LBS | HEIGHT: 64 IN | BODY MASS INDEX: 35 KG/M2 | SYSTOLIC BLOOD PRESSURE: 108 MMHG | DIASTOLIC BLOOD PRESSURE: 74 MMHG

## 2025-08-29 DIAGNOSIS — Z30.09 COUNSELING FOR BIRTH CONTROL REGARDING INTRAUTERINE DEVICE (IUD): ICD-10-CM
